# Patient Record
Sex: MALE | Race: WHITE | NOT HISPANIC OR LATINO | Employment: OTHER | URBAN - METROPOLITAN AREA
[De-identification: names, ages, dates, MRNs, and addresses within clinical notes are randomized per-mention and may not be internally consistent; named-entity substitution may affect disease eponyms.]

---

## 2020-01-07 ENCOUNTER — APPOINTMENT (OUTPATIENT)
Dept: PREADMISSION TESTING | Facility: HOSPITAL | Age: 60
End: 2020-01-07

## 2020-01-07 ENCOUNTER — HOSPITAL ENCOUNTER (OUTPATIENT)
Dept: GENERAL RADIOLOGY | Facility: HOSPITAL | Age: 60
Discharge: HOME | End: 2020-01-07
Admitting: ORTHOPAEDIC SURGERY

## 2020-01-07 VITALS — HEIGHT: 68 IN | WEIGHT: 225.09 LBS | BODY MASS INDEX: 34.11 KG/M2

## 2020-01-07 LAB
25(OH)D3 SERPL-MCNC: 20.1 NG/ML (ref 30–100)
ALBUMIN SERPL-MCNC: 4.2 G/DL (ref 3.5–5.2)
ALBUMIN/GLOB SERPL: 1.6 G/DL
ALP SERPL-CCNC: 87 U/L (ref 39–117)
ALT SERPL W P-5'-P-CCNC: 53 U/L (ref 1–41)
ANION GAP SERPL CALCULATED.3IONS-SCNC: 11 MMOL/L (ref 5–15)
APTT PPP: 34.6 SECONDS (ref 24–37)
AST SERPL-CCNC: 25 U/L (ref 1–40)
BASOPHILS # BLD AUTO: 0.03 10*3/MM3 (ref 0–0.2)
BASOPHILS NFR BLD AUTO: 0.5 % (ref 0–1.5)
BILIRUB SERPL-MCNC: 0.2 MG/DL (ref 0.2–1.2)
BUN BLD-MCNC: 15 MG/DL (ref 8–23)
BUN/CREAT SERPL: 16.5 (ref 7–25)
CALCIUM SPEC-SCNC: 9.4 MG/DL (ref 8.6–10.5)
CHLORIDE SERPL-SCNC: 103 MMOL/L (ref 98–107)
CO2 SERPL-SCNC: 29 MMOL/L (ref 22–29)
CREAT BLD-MCNC: 0.91 MG/DL (ref 0.76–1.27)
DEPRECATED RDW RBC AUTO: 42.7 FL (ref 37–54)
EOSINOPHIL # BLD AUTO: 0.15 10*3/MM3 (ref 0–0.4)
EOSINOPHIL NFR BLD AUTO: 2.5 % (ref 0.3–6.2)
ERYTHROCYTE [DISTWIDTH] IN BLOOD BY AUTOMATED COUNT: 13.1 % (ref 12.3–15.4)
GFR SERPL CREATININE-BSD FRML MDRD: 85 ML/MIN/1.73
GLOBULIN UR ELPH-MCNC: 2.7 GM/DL
GLUCOSE BLD-MCNC: 125 MG/DL (ref 65–99)
HBA1C MFR BLD: 6.1 % (ref 4.8–5.6)
HCT VFR BLD AUTO: 41.6 % (ref 37.5–51)
HGB BLD-MCNC: 13.2 G/DL (ref 13–17.7)
IMM GRANULOCYTES # BLD AUTO: 0.04 10*3/MM3 (ref 0–0.05)
IMM GRANULOCYTES NFR BLD AUTO: 0.7 % (ref 0–0.5)
INR PPP: 1.04 (ref 0.85–1.16)
LYMPHOCYTES # BLD AUTO: 1.99 10*3/MM3 (ref 0.7–3.1)
LYMPHOCYTES NFR BLD AUTO: 33.2 % (ref 19.6–45.3)
MCH RBC QN AUTO: 28.3 PG (ref 26.6–33)
MCHC RBC AUTO-ENTMCNC: 31.7 G/DL (ref 31.5–35.7)
MCV RBC AUTO: 89.1 FL (ref 79–97)
MONOCYTES # BLD AUTO: 0.38 10*3/MM3 (ref 0.1–0.9)
MONOCYTES NFR BLD AUTO: 6.3 % (ref 5–12)
MRSA DNA SPEC QL NAA+PROBE: NEGATIVE
NEUTROPHILS # BLD AUTO: 3.41 10*3/MM3 (ref 1.7–7)
NEUTROPHILS NFR BLD AUTO: 56.8 % (ref 42.7–76)
NRBC BLD AUTO-RTO: 0 /100 WBC (ref 0–0.2)
PLATELET # BLD AUTO: 255 10*3/MM3 (ref 140–450)
PMV BLD AUTO: 9.9 FL (ref 6–12)
POTASSIUM BLD-SCNC: 4.4 MMOL/L (ref 3.5–5.2)
PROT SERPL-MCNC: 6.9 G/DL (ref 6–8.5)
PROTHROMBIN TIME: 13.1 SECONDS (ref 11.2–14.3)
RBC # BLD AUTO: 4.67 10*6/MM3 (ref 4.14–5.8)
SODIUM BLD-SCNC: 143 MMOL/L (ref 136–145)
WBC NRBC COR # BLD: 6 10*3/MM3 (ref 3.4–10.8)

## 2020-01-07 PROCEDURE — 80053 COMPREHEN METABOLIC PANEL: CPT | Performed by: ORTHOPAEDIC SURGERY

## 2020-01-07 PROCEDURE — 36415 COLL VENOUS BLD VENIPUNCTURE: CPT

## 2020-01-07 PROCEDURE — 83036 HEMOGLOBIN GLYCOSYLATED A1C: CPT | Performed by: ORTHOPAEDIC SURGERY

## 2020-01-07 PROCEDURE — 85730 THROMBOPLASTIN TIME PARTIAL: CPT | Performed by: ORTHOPAEDIC SURGERY

## 2020-01-07 PROCEDURE — 85025 COMPLETE CBC W/AUTO DIFF WBC: CPT | Performed by: ORTHOPAEDIC SURGERY

## 2020-01-07 PROCEDURE — 71046 X-RAY EXAM CHEST 2 VIEWS: CPT

## 2020-01-07 PROCEDURE — 85610 PROTHROMBIN TIME: CPT | Performed by: ORTHOPAEDIC SURGERY

## 2020-01-07 PROCEDURE — 87641 MR-STAPH DNA AMP PROBE: CPT | Performed by: ORTHOPAEDIC SURGERY

## 2020-01-07 PROCEDURE — 82306 VITAMIN D 25 HYDROXY: CPT | Performed by: ORTHOPAEDIC SURGERY

## 2020-01-07 PROCEDURE — G0480 DRUG TEST DEF 1-7 CLASSES: HCPCS | Performed by: ORTHOPAEDIC SURGERY

## 2020-01-07 RX ORDER — ESCITALOPRAM OXALATE 20 MG/1
20 TABLET ORAL NIGHTLY
COMMUNITY

## 2020-01-07 RX ORDER — LISINOPRIL 10 MG/1
10 TABLET ORAL EVERY EVENING
COMMUNITY

## 2020-01-07 RX ORDER — METOPROLOL SUCCINATE 25 MG/1
25 TABLET, EXTENDED RELEASE ORAL EVERY EVENING
COMMUNITY

## 2020-01-07 RX ORDER — ATORVASTATIN CALCIUM 80 MG/1
80 TABLET, FILM COATED ORAL NIGHTLY
COMMUNITY

## 2020-01-07 ASSESSMENT — HOOS JR
HOOS JR SCORE: 19
HOOS JR SCORE: 29.009

## 2020-01-07 NOTE — PAT
Patient to apply Chlorhexadine wipes  to surgical area (as instructed) the night before procedure and the AM of procedure. Wipes provided.  Patient instructed to drink 20 ounces (or until full) of Gatorade and it needs to be completed 1 hour before given arrival time for procedure (NO RED Gatorade)    Patient verbalized understanding.  Yakelin Butcher notified of patient stating he had not been instructed to go to the joint class and is not attending today.   Colon screening information booklet given to patient during PAT visit  Patient does not have any dysuria, flank pain or urine frequency complaints.  EKG from 12/16/19 and cardiology note from Dr. Lopez on chart.

## 2020-01-08 ENCOUNTER — ANESTHESIA EVENT (OUTPATIENT)
Dept: PERIOP | Facility: HOSPITAL | Age: 60
End: 2020-01-08

## 2020-01-08 NOTE — PAT
Called [facility] office because RN trying to get clearance from Dr. Lopez have last office note from 12/16/19 on chart but no mention of clearance nor ok to stop Brilinta which patient stopped a week ago per Dr. Cevallos's instructions.  Per Jlil she has contacted the patient because Dr. Lopez office said patient was to have echo and stress test because he could be given clearance and patient was a no show.  Per Pattie she spoke with patient and he is to go today for testing and she is obtaining results if patient goes, if not he will be cancelled.

## 2020-01-09 ENCOUNTER — APPOINTMENT (OUTPATIENT)
Dept: GENERAL RADIOLOGY | Facility: HOSPITAL | Age: 60
End: 2020-01-09

## 2020-01-09 ENCOUNTER — ANESTHESIA (OUTPATIENT)
Dept: PERIOP | Facility: HOSPITAL | Age: 60
End: 2020-01-09

## 2020-01-09 ENCOUNTER — HOSPITAL ENCOUNTER (INPATIENT)
Facility: HOSPITAL | Age: 60
LOS: 1 days | Discharge: HOME | End: 2020-01-10
Attending: ORTHOPAEDIC SURGERY | Admitting: ORTHOPAEDIC SURGERY

## 2020-01-09 DIAGNOSIS — Z96.641 STATUS POST TOTAL REPLACEMENT OF RIGHT HIP: Primary | ICD-10-CM

## 2020-01-09 PROBLEM — M16.11 ARTHRITIS OF RIGHT HIP: Status: ACTIVE | Noted: 2020-01-09

## 2020-01-09 PROBLEM — I10 HYPERTENSION: Status: ACTIVE | Noted: 2020-01-09

## 2020-01-09 PROBLEM — I25.10 CORONARY ARTERY DISEASE: Status: ACTIVE | Noted: 2020-01-09

## 2020-01-09 PROBLEM — E78.5 HYPERLIPIDEMIA: Status: ACTIVE | Noted: 2020-01-09

## 2020-01-09 PROBLEM — R73.03 PREDIABETES: Status: ACTIVE | Noted: 2020-01-09

## 2020-01-09 LAB
GLUCOSE BLDC GLUCOMTR-MCNC: 216 MG/DL (ref 70–130)
GLUCOSE BLDC GLUCOMTR-MCNC: 223 MG/DL (ref 70–130)

## 2020-01-09 PROCEDURE — C1776 JOINT DEVICE (IMPLANTABLE): HCPCS | Performed by: ORTHOPAEDIC SURGERY

## 2020-01-09 PROCEDURE — C1713 ANCHOR/SCREW BN/BN,TIS/BN: HCPCS | Performed by: ORTHOPAEDIC SURGERY

## 2020-01-09 PROCEDURE — 76000 FLUOROSCOPY <1 HR PHYS/QHP: CPT

## 2020-01-09 PROCEDURE — 25010000003 CEFAZOLIN IN DEXTROSE 2-4 GM/100ML-% SOLUTION: Performed by: ORTHOPAEDIC SURGERY

## 2020-01-09 PROCEDURE — 63710000001 INSULIN LISPRO (HUMAN) PER 5 UNITS: Performed by: NURSE PRACTITIONER

## 2020-01-09 PROCEDURE — 97162 PT EVAL MOD COMPLEX 30 MIN: CPT | Performed by: PHYSICAL THERAPIST

## 2020-01-09 PROCEDURE — 25010000002 DEXAMETHASONE PER 1 MG: Performed by: NURSE ANESTHETIST, CERTIFIED REGISTERED

## 2020-01-09 PROCEDURE — 25010000002 CLONIDINE PER 1 MG: Performed by: ORTHOPAEDIC SURGERY

## 2020-01-09 PROCEDURE — 82962 GLUCOSE BLOOD TEST: CPT

## 2020-01-09 PROCEDURE — 0SR90JZ REPLACEMENT OF RIGHT HIP JOINT WITH SYNTHETIC SUBSTITUTE, OPEN APPROACH: ICD-10-PCS | Performed by: ORTHOPAEDIC SURGERY

## 2020-01-09 PROCEDURE — 25010000002 PROPOFOL 10 MG/ML EMULSION: Performed by: NURSE ANESTHETIST, CERTIFIED REGISTERED

## 2020-01-09 PROCEDURE — 25010000002 KETOROLAC TROMETHAMINE PER 15 MG: Performed by: ORTHOPAEDIC SURGERY

## 2020-01-09 PROCEDURE — 97116 GAIT TRAINING THERAPY: CPT | Performed by: PHYSICAL THERAPIST

## 2020-01-09 PROCEDURE — 25010000002 ONDANSETRON PER 1 MG: Performed by: NURSE ANESTHETIST, CERTIFIED REGISTERED

## 2020-01-09 PROCEDURE — 73502 X-RAY EXAM HIP UNI 2-3 VIEWS: CPT

## 2020-01-09 DEVICE — HD FEM/HIP G7 BIOLOX/DELTA OPTN 36MM: Type: IMPLANTABLE DEVICE | Site: HIP | Status: FUNCTIONAL

## 2020-01-09 DEVICE — SCRW ACET CORT TRILOGY S/TAP 6.5X25: Type: IMPLANTABLE DEVICE | Site: HIP | Status: FUNCTIONAL

## 2020-01-09 DEVICE — IMPLANTABLE DEVICE: Type: IMPLANTABLE DEVICE | Site: HIP | Status: FUNCTIONAL

## 2020-01-09 DEVICE — CAP CERAM HD HIP UPCHRG: Type: IMPLANTABLE DEVICE | Site: HIP | Status: FUNCTIONAL

## 2020-01-09 DEVICE — SHLL ACET OSSEOTI G7 3H SZE 52MM: Type: IMPLANTABLE DEVICE | Site: HIP | Status: FUNCTIONAL

## 2020-01-09 DEVICE — CAP HIP TM UPCHRG: Type: IMPLANTABLE DEVICE | Site: HIP | Status: FUNCTIONAL

## 2020-01-09 DEVICE — SUT NONABS MAXBRAID/PE NMBR2 HC5 38IN WHT 900333: Type: IMPLANTABLE DEVICE | Site: HIP | Status: FUNCTIONAL

## 2020-01-09 DEVICE — TOTAL HIP PRIMARY: Type: IMPLANTABLE DEVICE | Site: HIP | Status: FUNCTIONAL

## 2020-01-09 DEVICE — CAP HIP VE UPCHRG: Type: IMPLANTABLE DEVICE | Site: HIP | Status: FUNCTIONAL

## 2020-01-09 DEVICE — ADAPT HIP BIOLOX OPTN TYPE1 TPR MIN 6: Type: IMPLANTABLE DEVICE | Site: HIP | Status: FUNCTIONAL

## 2020-01-09 RX ORDER — CHOLECALCIFEROL (VITAMIN D3) 125 MCG
5 CAPSULE ORAL NIGHTLY PRN
Status: DISCONTINUED | OUTPATIENT
Start: 2020-01-09 | End: 2020-01-10 | Stop reason: HOSPADM

## 2020-01-09 RX ORDER — HYDROMORPHONE HYDROCHLORIDE 1 MG/ML
0.5 INJECTION, SOLUTION INTRAMUSCULAR; INTRAVENOUS; SUBCUTANEOUS
Status: DISCONTINUED | OUTPATIENT
Start: 2020-01-09 | End: 2020-01-09 | Stop reason: HOSPADM

## 2020-01-09 RX ORDER — SODIUM CHLORIDE 0.9 % (FLUSH) 0.9 %
10 SYRINGE (ML) INJECTION EVERY 12 HOURS SCHEDULED
Status: DISCONTINUED | OUTPATIENT
Start: 2020-01-09 | End: 2020-01-09 | Stop reason: HOSPADM

## 2020-01-09 RX ORDER — OXYCODONE HYDROCHLORIDE 5 MG/1
10 TABLET ORAL EVERY 4 HOURS PRN
Status: DISCONTINUED | OUTPATIENT
Start: 2020-01-09 | End: 2020-01-10 | Stop reason: HOSPADM

## 2020-01-09 RX ORDER — SODIUM CHLORIDE, SODIUM LACTATE, POTASSIUM CHLORIDE, CALCIUM CHLORIDE 600; 310; 30; 20 MG/100ML; MG/100ML; MG/100ML; MG/100ML
100 INJECTION, SOLUTION INTRAVENOUS CONTINUOUS
Status: DISCONTINUED | OUTPATIENT
Start: 2020-01-09 | End: 2020-01-10 | Stop reason: HOSPADM

## 2020-01-09 RX ORDER — DEXTROSE MONOHYDRATE 25 G/50ML
25 INJECTION, SOLUTION INTRAVENOUS
Status: DISCONTINUED | OUTPATIENT
Start: 2020-01-09 | End: 2020-01-10 | Stop reason: HOSPADM

## 2020-01-09 RX ORDER — ACETAMINOPHEN 500 MG
1000 TABLET ORAL EVERY 8 HOURS SCHEDULED
Status: DISCONTINUED | OUTPATIENT
Start: 2020-01-09 | End: 2020-01-10 | Stop reason: HOSPADM

## 2020-01-09 RX ORDER — LISINOPRIL 10 MG/1
10 TABLET ORAL DAILY
Status: DISCONTINUED | OUTPATIENT
Start: 2020-01-09 | End: 2020-01-10 | Stop reason: HOSPADM

## 2020-01-09 RX ORDER — ESCITALOPRAM OXALATE 20 MG/1
20 TABLET ORAL NIGHTLY
Status: DISCONTINUED | OUTPATIENT
Start: 2020-01-09 | End: 2020-01-10 | Stop reason: HOSPADM

## 2020-01-09 RX ORDER — BUPIVACAINE HYDROCHLORIDE 5 MG/ML
INJECTION, SOLUTION PERINEURAL
Status: COMPLETED | OUTPATIENT
Start: 2020-01-09 | End: 2020-01-09

## 2020-01-09 RX ORDER — KETOROLAC TROMETHAMINE 15 MG/ML
15 INJECTION, SOLUTION INTRAMUSCULAR; INTRAVENOUS EVERY 6 HOURS PRN
Status: DISCONTINUED | OUTPATIENT
Start: 2020-01-09 | End: 2020-01-10 | Stop reason: HOSPADM

## 2020-01-09 RX ORDER — MELOXICAM 15 MG/1
15 TABLET ORAL ONCE
Status: COMPLETED | OUTPATIENT
Start: 2020-01-09 | End: 2020-01-09

## 2020-01-09 RX ORDER — BISACODYL 10 MG
10 SUPPOSITORY, RECTAL RECTAL DAILY PRN
Status: DISCONTINUED | OUTPATIENT
Start: 2020-01-09 | End: 2020-01-10 | Stop reason: HOSPADM

## 2020-01-09 RX ORDER — AMOXICILLIN 250 MG
2 CAPSULE ORAL 2 TIMES DAILY PRN
Status: DISCONTINUED | OUTPATIENT
Start: 2020-01-09 | End: 2020-01-10 | Stop reason: HOSPADM

## 2020-01-09 RX ORDER — SODIUM CHLORIDE, SODIUM LACTATE, POTASSIUM CHLORIDE, CALCIUM CHLORIDE 600; 310; 30; 20 MG/100ML; MG/100ML; MG/100ML; MG/100ML
9 INJECTION, SOLUTION INTRAVENOUS CONTINUOUS
Status: DISCONTINUED | OUTPATIENT
Start: 2020-01-09 | End: 2020-01-09

## 2020-01-09 RX ORDER — NICOTINE POLACRILEX 4 MG
15 LOZENGE BUCCAL
Status: DISCONTINUED | OUTPATIENT
Start: 2020-01-09 | End: 2020-01-10 | Stop reason: HOSPADM

## 2020-01-09 RX ORDER — MELOXICAM 7.5 MG/1
15 TABLET ORAL DAILY
Status: DISCONTINUED | OUTPATIENT
Start: 2020-01-10 | End: 2020-01-10 | Stop reason: HOSPADM

## 2020-01-09 RX ORDER — FAMOTIDINE 20 MG/1
20 TABLET, FILM COATED ORAL ONCE
Status: COMPLETED | OUTPATIENT
Start: 2020-01-09 | End: 2020-01-09

## 2020-01-09 RX ORDER — PREGABALIN 75 MG/1
75 CAPSULE ORAL ONCE
Status: COMPLETED | OUTPATIENT
Start: 2020-01-09 | End: 2020-01-09

## 2020-01-09 RX ORDER — BISACODYL 5 MG/1
10 TABLET, DELAYED RELEASE ORAL DAILY PRN
Status: DISCONTINUED | OUTPATIENT
Start: 2020-01-09 | End: 2020-01-10 | Stop reason: HOSPADM

## 2020-01-09 RX ORDER — MAGNESIUM HYDROXIDE 1200 MG/15ML
LIQUID ORAL AS NEEDED
Status: DISCONTINUED | OUTPATIENT
Start: 2020-01-09 | End: 2020-01-09 | Stop reason: HOSPADM

## 2020-01-09 RX ORDER — LIDOCAINE HYDROCHLORIDE 10 MG/ML
0.5 INJECTION, SOLUTION EPIDURAL; INFILTRATION; INTRACAUDAL; PERINEURAL ONCE AS NEEDED
Status: COMPLETED | OUTPATIENT
Start: 2020-01-09 | End: 2020-01-09

## 2020-01-09 RX ORDER — CEFAZOLIN SODIUM 2 G/100ML
2 INJECTION, SOLUTION INTRAVENOUS ONCE
Status: COMPLETED | OUTPATIENT
Start: 2020-01-09 | End: 2020-01-09

## 2020-01-09 RX ORDER — METOPROLOL SUCCINATE 25 MG/1
25 TABLET, EXTENDED RELEASE ORAL DAILY
Status: DISCONTINUED | OUTPATIENT
Start: 2020-01-09 | End: 2020-01-10 | Stop reason: HOSPADM

## 2020-01-09 RX ORDER — ATORVASTATIN CALCIUM 40 MG/1
80 TABLET, FILM COATED ORAL NIGHTLY
Status: DISCONTINUED | OUTPATIENT
Start: 2020-01-09 | End: 2020-01-10 | Stop reason: HOSPADM

## 2020-01-09 RX ORDER — FENTANYL CITRATE 50 UG/ML
50 INJECTION, SOLUTION INTRAMUSCULAR; INTRAVENOUS
Status: DISCONTINUED | OUTPATIENT
Start: 2020-01-09 | End: 2020-01-09 | Stop reason: HOSPADM

## 2020-01-09 RX ORDER — DOCUSATE SODIUM 100 MG/1
100 CAPSULE, LIQUID FILLED ORAL 2 TIMES DAILY PRN
Status: DISCONTINUED | OUTPATIENT
Start: 2020-01-09 | End: 2020-01-10 | Stop reason: HOSPADM

## 2020-01-09 RX ORDER — HYDROMORPHONE HYDROCHLORIDE 1 MG/ML
0.5 INJECTION, SOLUTION INTRAMUSCULAR; INTRAVENOUS; SUBCUTANEOUS
Status: DISCONTINUED | OUTPATIENT
Start: 2020-01-09 | End: 2020-01-10 | Stop reason: HOSPADM

## 2020-01-09 RX ORDER — NALOXONE HCL 0.4 MG/ML
0.1 VIAL (ML) INJECTION
Status: DISCONTINUED | OUTPATIENT
Start: 2020-01-09 | End: 2020-01-10 | Stop reason: HOSPADM

## 2020-01-09 RX ORDER — OXYCODONE HYDROCHLORIDE 5 MG/1
5 TABLET ORAL EVERY 4 HOURS PRN
Status: DISCONTINUED | OUTPATIENT
Start: 2020-01-09 | End: 2020-01-10 | Stop reason: HOSPADM

## 2020-01-09 RX ORDER — ONDANSETRON 2 MG/ML
4 INJECTION INTRAMUSCULAR; INTRAVENOUS EVERY 6 HOURS PRN
Status: DISCONTINUED | OUTPATIENT
Start: 2020-01-09 | End: 2020-01-10 | Stop reason: HOSPADM

## 2020-01-09 RX ORDER — SODIUM CHLORIDE 0.9 % (FLUSH) 0.9 %
10 SYRINGE (ML) INJECTION AS NEEDED
Status: DISCONTINUED | OUTPATIENT
Start: 2020-01-09 | End: 2020-01-09 | Stop reason: HOSPADM

## 2020-01-09 RX ORDER — DEXAMETHASONE SODIUM PHOSPHATE 4 MG/ML
INJECTION, SOLUTION INTRA-ARTICULAR; INTRALESIONAL; INTRAMUSCULAR; INTRAVENOUS; SOFT TISSUE AS NEEDED
Status: DISCONTINUED | OUTPATIENT
Start: 2020-01-09 | End: 2020-01-09 | Stop reason: SURG

## 2020-01-09 RX ORDER — LABETALOL HYDROCHLORIDE 5 MG/ML
10 INJECTION, SOLUTION INTRAVENOUS EVERY 4 HOURS PRN
Status: DISCONTINUED | OUTPATIENT
Start: 2020-01-09 | End: 2020-01-10 | Stop reason: HOSPADM

## 2020-01-09 RX ORDER — ACETAMINOPHEN 500 MG
1000 TABLET ORAL ONCE
Status: COMPLETED | OUTPATIENT
Start: 2020-01-09 | End: 2020-01-09

## 2020-01-09 RX ORDER — CEFAZOLIN SODIUM 2 G/100ML
2 INJECTION, SOLUTION INTRAVENOUS EVERY 8 HOURS
Status: COMPLETED | OUTPATIENT
Start: 2020-01-09 | End: 2020-01-10

## 2020-01-09 RX ORDER — FAMOTIDINE 10 MG/ML
20 INJECTION, SOLUTION INTRAVENOUS ONCE
Status: DISCONTINUED | OUTPATIENT
Start: 2020-01-09 | End: 2020-01-09 | Stop reason: HOSPADM

## 2020-01-09 RX ORDER — ONDANSETRON 2 MG/ML
INJECTION INTRAMUSCULAR; INTRAVENOUS AS NEEDED
Status: DISCONTINUED | OUTPATIENT
Start: 2020-01-09 | End: 2020-01-09 | Stop reason: SURG

## 2020-01-09 RX ADMIN — PROPOFOL 60 MCG/KG/MIN: 10 INJECTION, EMULSION INTRAVENOUS at 10:30

## 2020-01-09 RX ADMIN — OXYCODONE HYDROCHLORIDE 10 MG: 5 TABLET ORAL at 21:09

## 2020-01-09 RX ADMIN — CEFAZOLIN SODIUM 2 G: 2 INJECTION, SOLUTION INTRAVENOUS at 18:00

## 2020-01-09 RX ADMIN — OXYCODONE HYDROCHLORIDE 5 MG: 5 TABLET ORAL at 17:27

## 2020-01-09 RX ADMIN — DOCUSATE SODIUM 100 MG: 100 CAPSULE, LIQUID FILLED ORAL at 21:08

## 2020-01-09 RX ADMIN — ACETAMINOPHEN 1000 MG: 500 TABLET ORAL at 08:04

## 2020-01-09 RX ADMIN — FAMOTIDINE 20 MG: 20 TABLET ORAL at 08:04

## 2020-01-09 RX ADMIN — TRANEXAMIC ACID 1000 MG: 100 INJECTION, SOLUTION INTRAVENOUS at 11:53

## 2020-01-09 RX ADMIN — INSULIN LISPRO 3 UNITS: 100 INJECTION, SOLUTION INTRAVENOUS; SUBCUTANEOUS at 17:40

## 2020-01-09 RX ADMIN — LISINOPRIL 10 MG: 10 TABLET ORAL at 17:40

## 2020-01-09 RX ADMIN — BUPIVACAINE HYDROCHLORIDE 1.8 ML: 5 INJECTION, SOLUTION PERINEURAL at 10:23

## 2020-01-09 RX ADMIN — ACETAMINOPHEN 1000 MG: 500 TABLET, FILM COATED ORAL at 21:09

## 2020-01-09 RX ADMIN — ATORVASTATIN CALCIUM 80 MG: 40 TABLET, FILM COATED ORAL at 21:09

## 2020-01-09 RX ADMIN — INSULIN LISPRO 3 UNITS: 100 INJECTION, SOLUTION INTRAVENOUS; SUBCUTANEOUS at 21:13

## 2020-01-09 RX ADMIN — EPHEDRINE SULFATE 10 MG: 50 INJECTION INTRAMUSCULAR; INTRAVENOUS; SUBCUTANEOUS at 10:35

## 2020-01-09 RX ADMIN — DEXAMETHASONE SODIUM PHOSPHATE 8 MG: 4 INJECTION, SOLUTION INTRAMUSCULAR; INTRAVENOUS at 10:40

## 2020-01-09 RX ADMIN — LIDOCAINE HYDROCHLORIDE 0.2 ML: 10 INJECTION, SOLUTION EPIDURAL; INFILTRATION; INTRACAUDAL; PERINEURAL at 08:04

## 2020-01-09 RX ADMIN — MELOXICAM 15 MG: 15 TABLET ORAL at 08:04

## 2020-01-09 RX ADMIN — MELATONIN TAB 5 MG 5 MG: 5 TAB at 21:09

## 2020-01-09 RX ADMIN — TRANEXAMIC ACID 1000 MG: 100 INJECTION, SOLUTION INTRAVENOUS at 10:30

## 2020-01-09 RX ADMIN — PREGABALIN 75 MG: 75 CAPSULE ORAL at 08:04

## 2020-01-09 RX ADMIN — MUPIROCIN 1 APPLICATION: 20 OINTMENT TOPICAL at 08:04

## 2020-01-09 RX ADMIN — SODIUM CHLORIDE, POTASSIUM CHLORIDE, SODIUM LACTATE AND CALCIUM CHLORIDE 9 ML/HR: 600; 310; 30; 20 INJECTION, SOLUTION INTRAVENOUS at 08:04

## 2020-01-09 RX ADMIN — ONDANSETRON 4 MG: 2 INJECTION INTRAMUSCULAR; INTRAVENOUS at 11:59

## 2020-01-09 RX ADMIN — ESCITALOPRAM 20 MG: 20 TABLET, FILM COATED ORAL at 21:08

## 2020-01-09 RX ADMIN — METOPROLOL SUCCINATE 25 MG: 25 TABLET, EXTENDED RELEASE ORAL at 17:40

## 2020-01-09 RX ADMIN — CEFAZOLIN SODIUM 2 G: 2 INJECTION, SOLUTION INTRAVENOUS at 10:15

## 2020-01-09 NOTE — THERAPY EVALUATION
Patient Name: Osvaldo Vences  : 1960    MRN: [id number]                              Today's Date: 2020       Admit Date: 2020    Visit Dx: No diagnosis found.  Patient Active Problem List   Diagnosis   • Arthritis of right hip   • Status post total replacement of right hip   • Hyperlipidemia   • Hypertension   • Coronary artery disease   • Prediabetes     Past Medical History:   Diagnosis Date   • Arthritis    • Coronary artery disease    • History of heart attack    • Horseshoe kidney     history of   • Hypertension    • Wears glasses      Past Surgical History:   Procedure Laterality Date   • CARDIAC CATHETERIZATION     • COLONOSCOPY      over ten years ago since last colonoscopy ; info given    • CORONARY ANGIOPLASTY WITH STENT PLACEMENT     • KIDNEY SURGERY      horseshoe kidney correction surgery      General Information     Row Name 20 1720          PT Evaluation Time/Intention    Document Type  evaluation  -CS     Mode of Treatment  individual therapy;physical therapy  -CS     Row Name 20 1720          General Information    Patient Profile Reviewed?  yes  -CS     Prior Level of Function  min assist:;bathing;home management;dressing;cooking;cleaning;gait mobility limited by R hip pain  -CS     Existing Precautions/Restrictions  fall;right;hip, anterior  -CS     Barriers to Rehab  none identified  -CS     Row Name 20 1720          Relationship/Environment    Lives With  spouse Spouse able to assist at all times once pt is discharged  -CS     Row Name 20 1720          Resource/Environmental Concerns    Current Living Arrangements  home/apartment/condo  -CS     Row Name 20 1720          Home Main Entrance    Number of Stairs, Main Entrance  one  -CS     Stair Railings, Main Entrance  none  -CS     Row Name 20 1720          Stairs Within Home, Primary    Stairs, Within Home, Primary  2 story house - but patient able to stay on first floor at discharge   -CS      Number of Stairs, Within Home, Primary  none  -CS     Row Name 01/09/20 1720          Cognitive Assessment/Intervention- PT/OT    Orientation Status (Cognition)  oriented x 4  -CS     Row Name 01/09/20 1720          Safety Issues, Functional Mobility    Safety Issues Affecting Function (Mobility)  safety precautions follow-through/compliance;safety precaution awareness  -CS     Impairments Affecting Function (Mobility)  endurance/activity tolerance;pain;range of motion (ROM);strength  -CS       User Key  (r) = Recorded By, (t) = Taken By, (c) = Cosigned By    Initials Name Provider Type    CS Zenaida Hugo, PT Physical Therapist        Mobility     Row Name 01/09/20 1720          Bed Mobility Assessment/Treatment    Bed Mobility Assessment/Treatment  supine-sit  -CS     Supine-Sit [place] (Bed Mobility)  verbal cues;minimum assist (75% patient effort)  -CS     Assistive Device (Bed Mobility)  head of bed elevated  -CS     Comment (Bed Mobility)  VC's to move LE to EOB. Pt needed min assist with lifting R LE to EOB due to weakness. Pt denied dizziness with sitting EOB  -CS     Row Name 01/09/20 1720          Transfer Assessment/Treatment    Comment (Transfers)  VC's to push off of bed to stand and to reach back for chair to sit. VC's to step R LE out with transfers for comfort.   -CS     Row Name 01/09/20 1720          Sit-Stand Transfer    Sit-Stand [place] (Transfers)  contact guard;2 person assist  -CS     Assistive Device (Sit-Stand Transfers)  walker, front-wheeled  -CS     Row Name 01/09/20 1720          Gait/Stairs Assessment/Training    10042 - Gait Training Minutes   10  -CS     Gait/Stairs Assessment/Training  gait/ambulation independence;gait/ambulation assistive device;gait pattern;distance ambulated  -     [place] Level (Gait)  contact guard;2 person assist  -CS     Assistive Device (Gait)  walker, front-wheeled  -CS     Distance in Feet (Gait)  350'  -     Pattern (Gait)   step-through  -CS     Deviations/Abnormal Patterns (Gait)  bilateral deviations;maia decreased;gait speed decreased;stride length decreased  -CS     Right Sided Gait Deviations  weight shift ability decreased  -CS     Comment (Gait/Stairs)  Pt able to amb 350' with RW CGA x 2 with step through gait mechanics. Pt limited by fatigue. VC's to stay within walker, increase heel strike on R LE, and increase WB on R LE.   -CS     Row Name 01/09/20 1720          Mobility Assessment/Intervention    Extremity Weight-bearing Status  right lower extremity  -CS     Right Lower Extremity (Weight-bearing Status)  weight-bearing as tolerated (WBAT)  -CS       User Key  (r) = Recorded By, (t) = Taken By, (c) = Cosigned By    Initials Name Provider Type    CS Zenaida Hugo PT Physical Therapist        Obj/Interventions     Row Name 01/09/20 1720          General ROM    GENERAL ROM COMMENTS  Pt's R hip ROM limited by 25%  -     Row Name 01/09/20 1720          MMT (Manual Muscle Testing)    General MMT Comments  Pt unable to perform SLR on R LE indep. Pt's R LE MMT grossly 3-/5  -CS     Row Name 01/09/20 1720          Therapeutic Exercise    Lower Extremity (Therapeutic Exercise)  gluteal sets;quad sets, right  -CS     Lower Extremity Range of Motion (Therapeutic Exercise)  ankle dorsiflexion/plantar flexion, bilateral  -CS     Exercise Type (Therapeutic Exercise)  isometric contraction, static;AROM (active range of motion)  -CS     Position (Therapeutic Exercise)  supine  -CS     Sets/Reps (Therapeutic Exercise)  10 reps each  -CS     Comment (Therapeutic Exercise)  VC's on technique. Pt able to actively dorsiflex  -     Row Name 01/09/20 1720          Sensory Assessment/Intervention    Sensory General Assessment  no sensation deficits identified  -CS       User Key  (r) = Recorded By, (t) = Taken By, (c) = Cosigned By    Initials Name Provider Type    Zenaida Orellana PT Physical Therapist        Goals/Plan      Row Name 01/09/20 1720          Bed Mobility Goal 1 (PT)    Activity/Assistive Device (Bed Mobility Goal 1, PT)  bed mobility activities, all  -CS     [place] Level/Cues Needed (Bed Mobility Goal 1, PT)  conditional independence  -CS     Time Frame (Bed Mobility Goal 1, PT)  long term goal (LTG);3 days  -CS     Progress/Outcomes (Bed Mobility Goal 1, PT)  goal ongoing  -CS     Row Name 01/09/20 1720          Transfer Goal 1 (PT)    Activity/Assistive Device (Transfer Goal 1, PT)  sit-to-stand/stand-to-sit;bed-to-chair/chair-to-bed;walker, rolling  -CS     [place] Level/Cues Needed (Transfer Goal 1, PT)  conditional independence  -CS     Time Frame (Transfer Goal 1, PT)  long term goal (LTG);3 days  -CS     Progress/Outcome (Transfer Goal 1, PT)  goal ongoing  -CS     Row Name 01/09/20 1720          Gait Training Goal 1 (PT)    Activity/Assistive Device (Gait Training Goal 1, PT)  gait (walking locomotion);assistive device use;walker, rolling  -CS     [place] Level (Gait Training Goal 1, PT)  conditional independence  -CS     Distance (Gait Goal 1, PT)  500'  -CS     Time Frame (Gait Training Goal 1, PT)  long term goal (LTG);3 days  -CS     Progress/Outcome (Gait Training Goal 1, PT)  goal ongoing  -CS     Row Name 01/09/20 1720          Stairs Goal 1 (PT)    Activity/Assistive Device (Stairs Goal 1, PT)  stairs, all skills;walker, rolling  -CS     [place] Level/Cues Needed (Stairs Goal 1, PT)  contact guard assist  -CS     Number of Stairs (Stairs Goal 1, PT)  1  -CS     Time Frame (Stairs Goal 1, PT)  long term goal (LTG);3 days  -CS     Progress/Outcome (Stairs Goal 1, PT)  goal ongoing  -CS       User Key  (r) = Recorded By, (t) = Taken By, (c) = Cosigned By    Initials Name Provider Type    CS Zenaida Hugo, PT Physical Therapist        Clinical Impression     Row Name 01/09/20 1720          Pain Assessment    Additional Documentation  Pain Scale: Numbers Pre/Post-Treatment (Group)   -CS     Row Name 01/09/20 1720          Pain Scale: Numbers Pre/Post-Treatment    Pain Scale: Numbers, Pretreatment  3/10  -CS     Pain Scale: Numbers, Post-Treatment  3/10  -CS     Pain Location - Side  Right  -CS     Pain Location - Orientation  lower  -CS     Pain Location  groin  -CS     Pain Intervention(s)  Repositioned;Ambulation/increased activity  -CS     Row Name 01/09/20 1720          Plan of Care Review    Plan of Care Reviewed With  patient;spouse  -CS     Progress  improving  -CS     Outcome Summary  Pt able to amb 350' with RW CGA x 2 with step through gait mechanics. Pt limited by fatigue. Encouraged patient to ambulate later with nursing. Recommend patient home with assist and home health PT at discharge. PADD: 11  -CS     Row Name 01/09/20 1720          Physical Therapy Clinical Impression    Patient/Family Goals Statement (PT Clinical Impression)  To go home  -CS     Criteria for Skilled Interventions Met (PT Clinical Impression)  yes;treatment indicated  -CS     Rehab Potential (PT Clinical Summary)  good, to achieve stated therapy goals  -CS     Predicted Duration of Therapy (PT)  3 days  -CS     Row Name 01/09/20 1720          Positioning and Restraints    Pre-Treatment Position  in bed  -CS     Post Treatment Position  chair  -CS     In Chair  notified nsg;reclined;call light within reach;encouraged to call for assist;exit alarm on;with family/caregiver;legs elevated  -CS       User Key  (r) = Recorded By, (t) = Taken By, (c) = Cosigned By    Initials Name Provider Type    CS Zenaida Hugo, PT Physical Therapist        Outcome Measures     Row Name 01/09/20 1720          How much help from another person do you currently need...    Turning from your back to your side while in flat bed without using bedrails?  3  -CS     Moving from lying on back to sitting on the side of a flat bed without bedrails?  3  -CS     Moving to and from a bed to a chair (including a wheelchair)?  3  -CS      Standing up from a chair using your arms (e.g., wheelchair, bedside chair)?  3  -CS     Climbing 3-5 steps with a railing?  3  -CS     To walk in hospital room?  3  -CS     AM-PAC 6 Clicks Score (PT)  18  -CS     Row Name 01/09/20 1720          Functional Assessment    Outcome Measure Options  AM-PAC 6 Clicks Basic Mobility (PT)  -CS       User Key  (r) = Recorded By, (t) = Taken By, (c) = Cosigned By    Initials Name Provider Type    CS Zenaida Hugo PT Physical Therapist        Physical Therapy Education                 Title: PT OT SLP Therapies (Done)     Topic: Physical Therapy (Done)     Point: Mobility training (Done)     Description:   Instruct learner(s) on safety and technique for assisting patient out of bed, chair or wheelchair.  Instruct in the proper use of assistive devices, such as walker, crutches, cane or brace.              Patient Friendly Description:   It's important to get you on your feet again, but we need to do so in a way that is safe for you. Falling has serious consequences, and your personal safety is the most important thing of all.        When it's time to get out of bed, one of us or a family member will sit next to you on the bed to give you support.     If your doctor or nurse tells you to use a walker, crutches, a cane, or a brace, be sure you use it every time you get out of bed, even if you think you don't need it.    Learning Progress Summary           Patient Acceptance, E,TB, VU by  at 1/9/2020 1720    Comment:  Educated patient on anterior hip precaution, WB status, safe hand placement with transfers, gait mechanics, and plan for progression of care.   Significant Other Acceptance, E,TB, VU by  at 1/9/2020 1720    Comment:  Educated patient on anterior hip precaution, WB status, safe hand placement with transfers, gait mechanics, and plan for progression of care.                   Point: Home exercise program (Done)     Description:   Instruct learner(s) on  appropriate technique for monitoring, assisting and/or progressing patient with therapeutic exercises and activities.              Learning Progress Summary           Patient Acceptance, E,TB, VU by CS at 1/9/2020 1720    Comment:  Educated patient on anterior hip precaution, WB status, safe hand placement with transfers, gait mechanics, and plan for progression of care.   Significant Other Acceptance, E,TB, VU by CS at 1/9/2020 1720    Comment:  Educated patient on anterior hip precaution, WB status, safe hand placement with transfers, gait mechanics, and plan for progression of care.                   Point: Body mechanics (Done)     Description:   Instruct learner(s) on proper positioning and spine alignment for patient and/or caregiver during mobility tasks and/or exercises.              Learning Progress Summary           Patient Acceptance, E,TB, VU by CS at 1/9/2020 1720    Comment:  Educated patient on anterior hip precaution, WB status, safe hand placement with transfers, gait mechanics, and plan for progression of care.   Significant Other Acceptance, E,TB, VU by CS at 1/9/2020 1720    Comment:  Educated patient on anterior hip precaution, WB status, safe hand placement with transfers, gait mechanics, and plan for progression of care.                   Point: Precautions (Done)     Description:   Instruct learner(s) on prescribed precautions during mobility and gait tasks              Learning Progress Summary           Patient Acceptance, E,TB, VU by CS at 1/9/2020 1720    Comment:  Educated patient on anterior hip precaution, WB status, safe hand placement with transfers, gait mechanics, and plan for progression of care.   Significant Other Acceptance, E,TB, VU by CS at 1/9/2020 1720    Comment:  Educated patient on anterior hip precaution, WB status, safe hand placement with transfers, gait mechanics, and plan for progression of care.                               User Key     Initials Effective Dates  Name Provider Type Discipline     03/26/19 -  Zenaida Hugo PT Physical Therapist PT              PT Recommendation and Plan  Planned Therapy Interventions (PT Eval): balance training, bed mobility training, gait training, home exercise program, neuromuscular re-education, patient/family education, ROM (range of motion), stair training, strengthening, transfer training  Outcome Summary/Treatment Plan (PT)  Anticipated Equipment Needs at Discharge (PT): other (see comments)(none)  Anticipated Discharge Disposition (PT): home with assist, home with home health  Plan of Care Reviewed With: patient, spouse  Progress: improving  Outcome Summary: Pt able to amb 350' with RW CGA x 2 with step through gait mechanics. Pt limited by fatigue. Encouraged patient to ambulate later with nursing. Recommend patient home with assist and home health PT at discharge. PADD: 11     Time Calculation:   PT Charges     Row Name 01/09/20 1720             Time Calculation    Start Time  1720  -      PT Received On  01/09/20  -      PT Goal Re-Cert Due Date  01/19/20  -         Time Calculation- PT    Total Timed Code Minutes- PT  12 minute(s)  -CS         Timed Charges    83423 - PT Therapeutic Exercise Minutes  2  -CS      22286 - Gait Training Minutes   10  -CS        User Key  (r) = Recorded By, (t) = Taken By, (c) = Cosigned By    Initials Name Provider Type     Zenaida Hugo, SHRADDHA Physical Therapist        Therapy Charges for Today     Code Description Service Date Service Provider Modifiers Qty    64085003576 HC GAIT TRAINING EA 15 MIN 1/9/2020 Zenaida Hugo, PT GP 1    99545615279 HC PT EVAL MOD COMPLEXITY 3 1/9/2020 Zenaida Hugo, PT GP 1    78112114336 HC PT THER SUPP EA 15 MIN 1/9/2020 Zenaida Hugo, PT GP 2          PT G-Codes  Outcome Measure Options: AM-PAC 6 Clicks Basic Mobility (PT)  AM-PAC 6 Clicks Score (PT): 18    Zenaida Hugo PT  1/9/2020

## 2020-01-09 NOTE — PLAN OF CARE
Problem: Patient Care Overview  Goal: Plan of Care Review  Flowsheets  Taken 1/9/2020 1819 by Zenaida Hugo, PT  Progress: improving  Taken 1/9/2020 1800 by Omayra Morel RN  Plan of Care Reviewed With: patient  Taken 1/9/2020 1720 by Zenaida Hugo, PT  Outcome Summary: Pt able to amb 350' with RW CGA x 2 with step through gait mechanics. Pt limited by fatigue. Encouraged patient to ambulate later with nursing. Recommend patient home with assist and home health PT at discharge. PADD: 11

## 2020-01-09 NOTE — ANESTHESIA PREPROCEDURE EVALUATION
Anesthesia Evaluation                  Airway   Mallampati: I  TM distance: >3 FB  Neck ROM: full  No difficulty expected  Dental      Pulmonary    Cardiovascular     ECG reviewed    (+) hypertension, CAD,       Neuro/Psych  GI/Hepatic/Renal/Endo    (+)   renal disease,     Musculoskeletal     Abdominal    Substance History      OB/GYN          Other                        Anesthesia Plan    ASA 3     spinal     intravenous induction     Anesthetic plan, all risks, benefits, and alternatives have been provided, discussed and informed consent has been obtained with: patient.    Plan discussed with CRNA.

## 2020-01-09 NOTE — INTERVAL H&P NOTE
"Pre-Op H&P (See Recent Office Note Attached for Full H&P)    Chief complaint: Right hip pain    Review of Systems:  General ROS:  no fever, chills, rashes, No change since last office visit  Cardiovascular ROS: no chest pain or dyspnea on exertion.  History of CAD s/p stents.  +cardiac clearance  Respiratory ROS: no cough, shortness of breath, or wheezing    Meds:    No current facility-administered medications on file prior to encounter.      No current outpatient medications on file prior to encounter.       Vital Signs:  /87 (BP Location: Right arm, Patient Position: Lying)   Pulse 67   Temp 97.6 °F (36.4 °C) (Temporal)   Resp 16   Ht 172.7 cm (67.99\")   Wt 102 kg (225 lb 1.4 oz)   SpO2 96%   BMI 34.23 kg/m²     Physical Exam:    CV:  S1S2 regular rate and rhythm, no murmur               Resp:  Clear to auscultation; respirations regular, even and unlabored    Results Review:     Lab Results   Component Value Date    WBC 6.00 01/07/2020    HGB 13.2 01/07/2020    HCT 41.6 01/07/2020    MCV 89.1 01/07/2020     01/07/2020    NEUTROABS 3.41 01/07/2020    GLUCOSE 125 (H) 01/07/2020    BUN 15 01/07/2020    CREATININE 0.91 01/07/2020    EGFRIFNONA 85 01/07/2020     01/07/2020    K 4.4 01/07/2020     01/07/2020    CO2 29.0 01/07/2020    CALCIUM 9.4 01/07/2020    ALBUMIN 4.20 01/07/2020    AST 25 01/07/2020    ALT 53 (H) 01/07/2020    BILITOT 0.2 01/07/2020        I reviewed the patient's new clinical results.    Cancer Staging (if applicable)  Cancer Patient: __ yes _x_no __unknown; If yes, clinical stage T:__ N:__M:__, stage group or __N/A    Chandni Lim, APRN  1/9/2020   8:13 AM    "

## 2020-01-09 NOTE — ANESTHESIA POSTPROCEDURE EVALUATION
Patient: Osvaldo Vences    Procedure Summary     Date:  01/09/20 Room / Location:   [facility]    Anesthesia Start:  1014 Anesthesia Stop:  1234    Procedure:  TOTAL HIP ARTHROPLASTY ANTERIOR DIRECT RIGHT (Right Hip) Diagnosis:       Arthritis of left hip      (Arthritis of left hip [[id number]])    Surgeon:  Colin Cevallos MD Provider:  Bishop Nguyen MD    Anesthesia Type:  spinal ASA Status:  3          Anesthesia Type: spinal    Vitals  No vitals data found for the desired time range.          Post Anesthesia Care and Evaluation    Patient location during evaluation: PACU  Patient participation: complete - patient participated  Level of consciousness: awake and alert  Pain score: 0  Pain management: adequate  Airway patency: patent  Anesthetic complications: No anesthetic complications  PONV Status: none  Cardiovascular status: hemodynamically stable and acceptable  Respiratory status: nonlabored ventilation, acceptable and nasal cannula  Hydration status: acceptable

## 2020-01-09 NOTE — OP NOTE
TOTAL HIP ARTHROPLASTY ANTERIOR  Procedure Report    Patient Name:  Osvaldo Vences  YOB: 1960    Date of Surgery:  1/9/2020     Indications:    59 y.o. male who was admitted to [facility] on a progressive management of nonoperative treatment of their hip osteoarthritis. Unfortunately patient progressed with significant pain and difficulty with ambulation and daily function.  The patient was indicated for a total hip arthroplasty. Likely risk benefits of the procedure including but not limited to infection, DVT, pulmonary embolism, leg length discrepancy, recurrent dislocation, possibility of injury to nerves and vessels, and periprosthetic fractures have been discussed with the patient and her daughter in detail. Despite the risks involved the patient elected to proceed with an informed consent was obtained.     Patient Identification:  Patient was seen in the prep, consent was reviewed, operative procedure was identified, surgical site and thigh marked.    Complexity Modifier:   Due to the patients Body mass index is 34.23 kg/m²., the surgery required additional surgical dissection, assistance with retraction, and increased surgical exposure in order to perform the total joint replacement. The required additional assistance in the operating room, unique instrumentation and techniques, increased time, increased risk, which all made for a more complex and difficult joint replacement procedure. This way due to the increased body mass index and obesity in addition to the severity of the arthritis. Therefore, a Modifier 22 is being utilized.      Pre-op Diagnosis:   Arthritis of right hip [1955466]       Post-Op Diagnosis Codes:     * Arthritis of right hip [M16.11]    Procedure/CPT® Codes:      Procedure(s):  TOTAL HIP ARTHROPLASTY ANTERIOR DIRECT RIGHT    Staff:  Surgeon(s):  Colin Cevallos MD    Assistant: Neena Livingston PA-C    Anesthesia: Spinal    Estimated Blood Loss:  200ml    Implants:    Implant Name Type Inv. Item Serial No.  Lot No. LRB No. Used   SUT MAXBRAID 2 HC5 38IN [facility] [id number] - [id number] Implant SUT MAXBRAID 2 HC5 38IN [facility] [id number]  [employer]  Right 2   SHLL ACET OSSEOTI G7 3H CORBIN 52MM - [id number] Implant SHLL ACET OSSEOTI G7 3H CORBIN 52MM  [employer] [id number] Right 1   SCRW ACET RAMIREZ TRILOGY S/TAP 6.5X25 - [id number] Implant SCRW ACET RAMIREZ TRILOGY S/TAP 6.5X25  [employer] [id number] Right 1   LINER ACET G7 NEUTRAL E1 CORBIN 36MM - [id number] Implant LINER ACET G7 NEUTRAL E1 CORBIN 36MM  [employer] [id number] Right 1   STEM FEM TAPERLOC COMPL DIST REDUC HI9 - [id number] Implant STEM FEM TAPERLOC COMPL DIST REDUC HI9  [employer] [id number] Right 1   HD FEM BIOLOX OPTN TYPE1 TPR  16/18 36 - SNA - [id number] Implant HD FEM BIOLOX OPTN TYPE1 TPR  16/18 36 NA [employer] [id number] Right 1   ADAPT HIP BIOLOX OPTN TYPE1 TPR MIN 6 - SNA - [id number] Implant ADAPT HIP BIOLOX OPTN TYPE1 TPR MIN 6 NA [employer] [id number] Right 1       Specimen:          None      Findings: See operative dictation    Complications: None    Description of Procedure: The patient was transferred to [facility] operating room preoperative antibiotics in form of Kefzol 1gm and Kefzol 2gm Given IV Prior to Skin Incision As Well As 1 G of Tranexemic Acid. Patient Received Spinalanesthesia and was transferred to the [facility]. All Bony Prominences Were Padded Adequately. The Operative Hip Was Then Prepped and Draped in the Usual Sterile Fashion. Multiple timeouts Were Done Identifying the Correct Patient Surgical Site and Planned Procedure.  A Skin Incision Was Made Overlying the Anterior Lateral Aspect of the Hip for about 10 Cm Just below the Lateral to the Anterior Superior Iliac Spine. Skin and Subcutaneous Tissue and Fascia Was Incised in Line with the Skin Incision Overlying the Tensor Fascia Cassy Muscle. The Tensor Muscle Was Then Retracted Laterally and the Interval  between Sartorius and Rectus Femoris Medially and the Tensor Fascia Muscle Were Developed. The Lateral Femoral Circumflex Vessels Were Identified They Were Ligated without Difficulty. The deep Fascia Was Incised and the Capsule of the Hip Joint Where Was Identified. We then placed a soft tissue protecting device deep to the rectus and the tensor. Retractors were then Placed Extracapsular the Capsule Was Then Incised in a T-Shaped Manner and the Anterior Posterior Capsules Were Then Tied with maxbraid suture . Following This Retractors Were Placed Intracapsularly. We Did Identify the Obvious signs of severe osteoarthritis upon Incising the Capsule. We Then Made Appropriate Releases Done on the Inferior Medial Neck and along the Saddle of the Femoral Neck Femoral Neck Remaining Was Identified and Osteotomy Was Done and According to the Preoperative Templating Is an Oscillating Saw. The Femoral Head and Cut Neck with Extracted Using a Corkscrew without Difficulty the Femoral Head Did Have Major Signs of Articular Cartilage Loss Loss or Superior Wear.  The Acetabular Cavity Was Exposed by Placing Retractors and taking Care to Protect the Anterior vascular Structures the Labrum Was Excised the pulvinar was Excised from the Cotyloid Fossa Acetabular Cavity Was Then Progressively Reamed Maintaining the Correct Inclination and Version under Image Intensifier Control. Adequate Medialization Was Obtained. Adequate Fit Was Found to Be Obtained with the Reamer Size of 51. The Biomet G7 ossioiti Cup Size 52 Was Then Impacted into Position. This Found to Seat Satisfactorily with Adequate Inclination and Anteversion. It Was Stable but we did Insert 1 6.5/25 Millimeter Lag Screw This Was Checked under Fluoroscopic Fluoroscopy and Found to Be in Good Position. Following this the cup was cleaned and then a 36 neutralE-Poly impacted Following This the Periarticular Tissues Were Then Infiltrated with Local Anesthetic.  Attention Was  Then Directed to the Proximal Femur the Proximal Femur Was Delivered Using a Trochanteric Hook Placed Just Distal to the Vastus Tubercle and Proximal to the gluet Javi Tendon. The leg was then Externally Rotated Gross Traction Released and Hyperextension and Adduction Was Done Using the Karolyn Table. Mechanical Lift on the Table Was Utilized to Support the Femur Appropriate Capsular Releases Were Made to Expose the Medial Solano of the Greater Trochanter and Proximal Femur Was Delivered the Femoral Canal Was Then Entered by Removing Lateral Femoral Neck and with a Box Chisel by Serial Broaching Adequate Fit Was Found to Be Obtained with the Size 9 broach. We then trialed a -6 neck was reduced fluoroscopic imaging was used to assess leg length and offset was found to be symmetric. The trials were then removed a #9 Taperloc complete press-fit stem high offset was implanted in appropriate anteversion. It sat very similar to our broach trial we chose the -6 neck ceramic with titanium sleeve. This was then reduced again fluoroscopy remaining images both lateral and AP of the femur showed the stem to be in good position AP pelvis showed symmetric leg length and offset. The hip was then taken through a range of motion and found to be stable. There were no acute fractures there and wound was then thoroughly irrigated saline we did use of more of the injection cocktail. Betadine irrigation was used followed by 3L of saline irrigation. Soft tissue hemostasis was secured and topical TXA was used. Sponge and needle instrument counts were correct maxibraid sutures directly anterior and posterior capsular flaps were tied to each other then closed the fascial layer with a running #2 strata fix followed by 2-0 Vicryl and then Monocryl for the skin and Dermabond with perneo mesh over the top. Once the Dermabond had dried sterile dressing placed over the top. The patient was then transferred to the recovery room in stable condition  patient tolerated the procedure well there were no Medications the patient adequate distal pulses and good cap refill.  The patient will be mobilized by physical therapy received antibiotic prophylaxis postoperatively for 2 more doses given the first 24 hours. The patient was started on DVT prophylaxis with patient's home dose of Brilinta on starting postoperative day #1.  I discussed the satisfactory performance of the procedure with the patient's family and discussed the postoperative management.      Assistant Participation:  Surgeon(s):  Colin Cevallos MD    Assistant: Neena Livingston PA-C assisted with proper preoperative positioning, preoperative templating, determingin availability of proper implants, prepping and draping of patient, manipulation placement of instruments, protection of ligaments and vital soft tissue structures, assistance in maintaining hemostasis and assistance with closure of the wound. Their skills and knowledge of the steps of operation and the desired outcome of each surgical step was crucial, allowing for efficient choreography of surgical procedure, and closure of the wound which lead to reduced surgical time, less blood loss, and less risk of complications for the patient.       Colin Cevallos MD     Date: 1/9/2020  Time: 12:21 PM

## 2020-01-09 NOTE — ANESTHESIA PROCEDURE NOTES
Spinal Block      Patient reassessed immediately prior to procedure    Patient location during procedure: OR  Indication:at surgeon's request  Performed By  CRNA: Diogenes Ramon CRNA  Preanesthetic Checklist  Completed: patient identified, site marked, surgical consent, pre-op evaluation, timeout performed, IV checked, risks and benefits discussed and monitors and equipment checked  Spinal Block Prep:  Patient Position:sitting  Sterile Tech:cap, gloves, sterile barriers and mask  Prep:Chloraprep  Patient Monitoring:blood pressure monitoring, continuous pulse oximetry and EKG  Spinal Block Procedure  Approach:midline  Guidance:landmark technique and palpation technique  Location:L4-L5  Needle Type:Quincke  Needle Gauge:22 G  Placement of Spinal needle event:cerebrospinal fluid aspirated  Paresthesia: no  Fluid Appearance:clear  Medications: bupivacaine (MARCAINE) 0.5 % injection, 1.8 mL  Med Administered at 1/9/2020 10:23 AM   Post Assessment  Patient Tolerance:patient tolerated the procedure well with no apparent complications  Complications no  Additional Notes  Procedure:  Pt assisted to sitting position, with legs in position of comfort over side of bed.  Pt. instructed in optimal spine presentation, the spine was prepped/ Draped and the skin at insertion site was anesthetized with 1% Lidocaine 2 ml.  The spinal needle was then advanced until CSF flow was obtained and LA was injected:

## 2020-01-09 NOTE — H&P
"Patient Name: Osvaldo Vences  MRN: [id number]  : 1960  DOS: 2020    Attending: Colin Cevallos MD    Primary Care Provider: J Carlos Cagle MD      Chief complaint: Right hip pain    Subjective   Patient is a 59 y.o. male presented for right total hip arthroplasty by Dr. Cevallos.    He underwent surgery under spinal anesthesia. He tolerated surgery well and is admitted for further medical management. His hip has been painful for many years. He denies use of assistive device for ambulation or recent falls.    When seen in PACU he is doing well. His pain is well controlled. He denies nausea, shortness of breath or chest pain. No hx of DVT or PE.    He has hx of HTN, HLD, CAD. He is followed by Dr. Lopez, cardiology, and had preop cardiac clearance.     ( Above is noted/ agree. Seen in his room afterwards. Doing well. Good pain control. No f/c/n/vom/sob. Seen by PT \"Pt able to amb 350' with RW CGA x 2 with step through gait mechanics.\" )wy  Allergies:  No Known Allergies    Meds:  Medications Prior to Admission   Medication Sig Dispense Refill Last Dose   • atorvastatin (LIPITOR) 80 MG tablet Take 80 mg by mouth Every Night.   2020 at 2100   • escitalopram (LEXAPRO) 20 MG tablet Take 20 mg by mouth Every Night.   2020 at 2100   • lisinopril (PRINIVIL,ZESTRIL) 10 MG tablet Take 10 mg by mouth Daily.   2020 at 2100   • metoprolol succinate XL (TOPROL-XL) 25 MG 24 hr tablet Take 25 mg by mouth Daily.   2020 at 2100   • Ticagrelor (BRILINTA PO) Take 60 mg by mouth 2 (Two) Times a Day. Patient's last dose was 1-2-20 per Dr. Cevallos's orders-left message with Dr. Cevallos and Dr. Lopez to \"ok\" to stop.   2020 at 0800       History:   Past Medical History:   Diagnosis Date   • Arthritis    • Coronary artery disease    • History of heart attack    • Horseshoe kidney     history of   • Hypertension    • Wears glasses      Past Surgical History:   Procedure Laterality Date   • CARDIAC " "CATHETERIZATION     • COLONOSCOPY      over ten years ago since last colonoscopy ; info given    • CORONARY ANGIOPLASTY WITH STENT PLACEMENT     • KIDNEY SURGERY      horseshoe kidney correction surgery      History reviewed. No pertinent family history.  Social History     Tobacco Use   • Smoking status: Never Smoker   • Smokeless tobacco: Never Used   Substance Use Topics   • Alcohol use: Not Currently     Frequency: Never   • Drug use: Never   He is  with 1 children. She is retired from [employer].    Review of Systems  Pertinent items are noted in HPI, all other systems reviewed and negative    Vital Signs  Visit Vitals  /90   Pulse 87   Temp 97.4 °F (36.3 °C)   Resp 16   Ht 172.7 cm (67.99\")   Wt 102 kg (225 lb 1.4 oz)   SpO2 99%   BMI 34.23 kg/m²       Physical Exam:    General Appearance:    Alert, cooperative, in no acute distress   Head:    Normocephalic, without obvious abnormality, atraumatic   Eyes:            Lids and lashes normal, conjunctivae and sclerae normal, no   icterus, no pallor, corneas clear   Ears:    Ears appear intact with no abnormalities noted   Neck:   No adenopathy, supple, trachea midline, no thyromegaly   Lungs:     Clear to auscultation, respirations regular, even and                   unlabored    Heart:    Regular rhythm and normal rate, normal S1 and S2, no            murmur, no gallop   Abdomen:     Normal bowel sounds, no masses, no organomegaly, soft        non-tender, non-distended, no guarding, no rebound                 tenderness   Genitalia:    Deferred   Extremities:   Right hip Optifoam CDI.   Pulses:   Pulses palpable and equal bilaterally   Skin:   No bleeding, bruising or rash   Neurologic:   Cranial nerves 2 - 12 grossly intact, sensation intact. Flexion and dorsiflexion intact bilateral feet.        I reviewed the patient's new clinical results.       Results from last 7 days   Lab Units 01/07/20  1225   WBC 10*3/mm3 6.00   HEMOGLOBIN g/dL 13.2 "   HEMATOCRIT % 41.6   PLATELETS 10*3/mm3 255     Results from last 7 days   Lab Units 01/07/20  1225   SODIUM mmol/L 143   POTASSIUM mmol/L 4.4   CHLORIDE mmol/L 103   CO2 mmol/L 29.0   BUN mg/dL 15   CREATININE mg/dL 0.91   CALCIUM mg/dL 9.4   BILIRUBIN mg/dL 0.2   ALK PHOS U/L 87   ALT (SGPT) U/L 53*   AST (SGOT) U/L 25   GLUCOSE mg/dL 125*     Lab Results   Component Value Date    HGBA1C 6.10 (H) 01/07/2020       Assessment and Plan:     Status post total replacement of right hip    Arthritis of right hip    Hyperlipidemia    Hypertension    Coronary artery disease    Prediabetes      Plan  1. PT/OT- early ambulation postop  2. Pain control-prns   3. IS-encourage  4. DVT proph- Mechs/brilinta ( starting POD 1)wy  5. Bowel regimen  6. Resume home medications as appropriate  7. Monitor post-op labs  8. Discharge planning forhome    HTN, Hyperlipidemia, CAD  - Continue home statin, lisinopril and Toprol  - Monitor BP   - Holding parameters for BP meds  - Labetalol PRN for SBP>170    PreDM  - hgb A1c on 1/7/2020 6.1  - Accu-Chek AC and HS with low dose SSI      I believe this patient meets INPATIENT status due to the need for care which can only be reasonably provided in a hospital setting such as aggressive/expedited ancillary services and/or consultation services, the necessity for IV medications, close physician monitoring and/or the possible need for procedures.  As such, I feel patient’s risk for adverse outcomes and need for care warrant INPATIENT evaluation and predict the patient’s care encounter to likely last beyond 2 midnights.      EDUARDO Reece  01/09/20  3:53 PM     I have personally performed the evaluation on this patient. My history is consistent  with HPI obtained. My exam findings are listed above. I have personally reviewed and discussed the above formulated treatment plan with APRN.  Late entry for visit 1/9/20.wy

## 2020-01-10 VITALS
HEIGHT: 68 IN | SYSTOLIC BLOOD PRESSURE: 117 MMHG | BODY MASS INDEX: 34.11 KG/M2 | HEART RATE: 69 BPM | OXYGEN SATURATION: 94 % | TEMPERATURE: 97.5 F | WEIGHT: 225.09 LBS | RESPIRATION RATE: 16 BRPM | DIASTOLIC BLOOD PRESSURE: 64 MMHG

## 2020-01-10 PROBLEM — G89.18 ACUTE POSTOPERATIVE PAIN: Status: ACTIVE | Noted: 2020-01-10

## 2020-01-10 PROBLEM — D62 ACUTE BLOOD LOSS ANEMIA: Status: ACTIVE | Noted: 2020-01-10

## 2020-01-10 PROBLEM — D72.829 LEUKOCYTOSIS: Status: ACTIVE | Noted: 2020-01-10

## 2020-01-10 LAB
ANION GAP SERPL CALCULATED.3IONS-SCNC: 10 MMOL/L (ref 5–15)
BUN BLD-MCNC: 17 MG/DL (ref 8–23)
BUN/CREAT SERPL: 19.8 (ref 7–25)
CALCIUM SPEC-SCNC: 8.6 MG/DL (ref 8.6–10.5)
CHLORIDE SERPL-SCNC: 103 MMOL/L (ref 98–107)
CO2 SERPL-SCNC: 25 MMOL/L (ref 22–29)
CREAT BLD-MCNC: 0.86 MG/DL (ref 0.76–1.27)
DEPRECATED RDW RBC AUTO: 42.9 FL (ref 37–54)
ERYTHROCYTE [DISTWIDTH] IN BLOOD BY AUTOMATED COUNT: 13.2 % (ref 12.3–15.4)
GFR SERPL CREATININE-BSD FRML MDRD: 90 ML/MIN/1.73
GLUCOSE BLD-MCNC: 138 MG/DL (ref 65–99)
GLUCOSE BLDC GLUCOMTR-MCNC: 117 MG/DL (ref 70–130)
GLUCOSE BLDC GLUCOMTR-MCNC: 130 MG/DL (ref 70–130)
HCT VFR BLD AUTO: 34.2 % (ref 37.5–51)
HGB BLD-MCNC: 11 G/DL (ref 13–17.7)
MCH RBC QN AUTO: 28.8 PG (ref 26.6–33)
MCHC RBC AUTO-ENTMCNC: 32.2 G/DL (ref 31.5–35.7)
MCV RBC AUTO: 89.5 FL (ref 79–97)
PLATELET # BLD AUTO: 220 10*3/MM3 (ref 140–450)
PMV BLD AUTO: 10.3 FL (ref 6–12)
POTASSIUM BLD-SCNC: 4.4 MMOL/L (ref 3.5–5.2)
RBC # BLD AUTO: 3.82 10*6/MM3 (ref 4.14–5.8)
SODIUM BLD-SCNC: 138 MMOL/L (ref 136–145)
WBC NRBC COR # BLD: 12.08 10*3/MM3 (ref 3.4–10.8)

## 2020-01-10 PROCEDURE — 25010000002 KETOROLAC TROMETHAMINE PER 15 MG: Performed by: ORTHOPAEDIC SURGERY

## 2020-01-10 PROCEDURE — 85027 COMPLETE CBC AUTOMATED: CPT | Performed by: NURSE PRACTITIONER

## 2020-01-10 PROCEDURE — 80048 BASIC METABOLIC PNL TOTAL CA: CPT | Performed by: ORTHOPAEDIC SURGERY

## 2020-01-10 PROCEDURE — 25010000003 CEFAZOLIN IN DEXTROSE 2-4 GM/100ML-% SOLUTION: Performed by: ORTHOPAEDIC SURGERY

## 2020-01-10 PROCEDURE — 82962 GLUCOSE BLOOD TEST: CPT

## 2020-01-10 PROCEDURE — 97110 THERAPEUTIC EXERCISES: CPT | Performed by: PHYSICAL THERAPIST

## 2020-01-10 PROCEDURE — 97116 GAIT TRAINING THERAPY: CPT | Performed by: PHYSICAL THERAPIST

## 2020-01-10 RX ORDER — PSEUDOEPHEDRINE HCL 30 MG
100 TABLET ORAL 2 TIMES DAILY PRN
Start: 2020-01-10 | End: 2020-01-24

## 2020-01-10 RX ORDER — HYDROCODONE BITARTRATE AND ACETAMINOPHEN 7.5; 325 MG/1; MG/1
1 TABLET ORAL EVERY 6 HOURS PRN
Start: 2020-01-10 | End: 2022-11-04 | Stop reason: HOSPADM

## 2020-01-10 RX ADMIN — MELOXICAM 15 MG: 7.5 TABLET ORAL at 10:41

## 2020-01-10 RX ADMIN — OXYCODONE HYDROCHLORIDE 10 MG: 5 TABLET ORAL at 00:40

## 2020-01-10 RX ADMIN — OXYCODONE HYDROCHLORIDE 5 MG: 5 TABLET ORAL at 10:41

## 2020-01-10 RX ADMIN — TICAGRELOR 60 MG: 60 TABLET ORAL at 10:40

## 2020-01-10 RX ADMIN — METOPROLOL SUCCINATE 25 MG: 25 TABLET, EXTENDED RELEASE ORAL at 10:41

## 2020-01-10 RX ADMIN — CEFAZOLIN SODIUM 2 G: 2 INJECTION, SOLUTION INTRAVENOUS at 02:49

## 2020-01-10 RX ADMIN — ACETAMINOPHEN 1000 MG: 500 TABLET, FILM COATED ORAL at 06:32

## 2020-01-10 RX ADMIN — KETOROLAC TROMETHAMINE 15 MG: 15 INJECTION, SOLUTION INTRAMUSCULAR; INTRAVENOUS at 00:38

## 2020-01-10 NOTE — PROGRESS NOTES
Discharge Planning Assessment  [facility]     Patient Name: Osvaldo Meredith  MRN: [id number]  Today's Date: 1/10/2020    Admit Date: 1/9/2020    Discharge Needs Assessment     Row Name 01/10/20 1051       Living Environment    Lives With  spouse    Name(s) of Who Lives With Patient  Grecia Meredith ( spouse) [phone]    Current Living Arrangements  home/apartment/condo    Primary Care Provided by  self    Provides Primary Care For  no one    Family Caregiver if Needed  spouse    Family Caregiver Names  Grecia ( spouse)    Quality of Family Relationships  helpful;involved;supportive    Able to Return to Prior Arrangements  yes       Resource/Environmental Concerns    Resource/Environmental Concerns  none    Transportation Concerns  car, none       Transition Planning    Patient/Family Anticipates Transition to  home with family    Patient/Family Anticipated Services at Transition  rehabilitation services;outpatient care    Transportation Anticipated  family or friend will provide       Discharge Needs Assessment    Readmission Within the Last 30 Days  no previous admission in last 30 days    Concerns to be Addressed  discharge planning;basic needs    Equipment Currently Used at Home  walker, rolling    Anticipated Changes Related to Illness  inability to care for self    Equipment Needed After Discharge  none    Outpatient/Agency/Support Group Needs  outpatient therapy    Provided post acute provider list?  -- arrangemetns already made pre admit with [facility]    Current Discharge Risk  dependent with mobility/activities of daily living        Discharge Plan     Row Name 01/10/20 1055       Plan    Plan  Home with oupt PT    Plan Comments  I met with Mr meredith and wife at bedside to discuss d/c plan. His plan is to return home. He lives with wife who works,but has arranged to take time off to assist. they live in a two level home,but he stays on main level. He already has a rolling walker. Dr Cevallos's office has made  arrangements for  his office  ( [facility]) to provide  outpt PT, they will see him in his home until ready to transition to outpatient. No other d/c needs identified.    Final Discharge Disposition Code  01 - home or self-care        Destination      Coordination has not been started for this encounter.      Durable Medical Equipment      Coordination has not been started for this encounter.      Dialysis/Infusion      Coordination has not been started for this encounter.      Home Medical Care      Coordination has not been started for this encounter.      Therapy      Coordination has not been started for this encounter.      Community Resources      Coordination has not been started for this encounter.        Expected Discharge Date and Time     Expected Discharge Date Expected Discharge Time    Cesar 10, 2020         Demographic Summary     Row Name 01/10/20 1050       General Information    Admission Type  inpatient    Arrived From  home    Referral Source  physician    Reason for Consult  discharge planning    Preferred Language  English    General Information Comments  PCP- J Carlos Cagle       Contact Information    Permission Granted to Share Info With  ;family/designee        Functional Status     Row Name 01/10/20 1051       Functional Status    Usual Activity Tolerance  good       Functional Status, IADL    Medications  independent    Meal Preparation  independent    Housekeeping  independent    Laundry  independent    Shopping  independent       Mental Status    General Appearance WDL  WDL       Mental Status Summary    Recent Changes in Mental Status/Cognitive Functioning  no changes       Employment/    Employment Status  retired    Employment/ Comments  insurance- Medicare & [employer]        Psychosocial    No documentation.       Abuse/Neglect    No documentation.       Legal    No documentation.       Substance Abuse    No documentation.       Patient Forms     No documentation.           Sonja C Kellerman, RN

## 2020-01-10 NOTE — PLAN OF CARE
Problem: Patient Care Overview  Goal: Plan of Care Review  Outcome: Ongoing (interventions implemented as appropriate)  Flowsheets (Taken 1/10/2020 8332)  Progress: improving  Plan of Care Reviewed With: patient  Outcome Summary: Pt continues to do well. Pt remains A&Ox4. Pt VSS throughout shift. Pt pain has been well controlled w PO pain meds, and one dose IV toradol. Pt dressing remains c/d/i. Pt has ambulated in olmos during shift. Pt continues to void spontaneously. Plan for possible d/c today. Will continue to monitor.  Goal: Individualization and Mutuality  Outcome: Ongoing (interventions implemented as appropriate)  Goal: Discharge Needs Assessment  Outcome: Ongoing (interventions implemented as appropriate)  Goal: Interprofessional Rounds/Family Conf  Outcome: Ongoing (interventions implemented as appropriate)     Problem: Pain, Chronic (Adult)  Goal: Acceptable Pain/Comfort Level and Functional Ability  Outcome: Ongoing (interventions implemented as appropriate)  Flowsheets (Taken 1/10/2020 4879)  Acceptable Pain/Comfort Level and Functional Ability: making progress toward outcome     Problem: Hip Arthroplasty (Total, Partial) (Adult)  Goal: Signs and Symptoms of Listed Potential Problems Will be Absent, Minimized or Managed (Hip Arthroplasty)  Outcome: Ongoing (interventions implemented as appropriate)  Flowsheets (Taken 1/10/2020 0359)  Problems Assessed (Hip Arthroplasty): all  Problems Present (Hip Arthroplasty): pain; situational response  Goal: Anesthesia/Sedation Recovery  Outcome: Ongoing (interventions implemented as appropriate)

## 2020-01-10 NOTE — PROGRESS NOTES
Orthopedic Progress Note      Patient: Osvaldo Vences  YOB: 1960     Date of Admission: 1/9/2020  7:20 AM Medical Record Number: [id number]     Attending Physician: Colin Cevallos MD    Status Post:  Procedure(s):  TOTAL HIP ARTHROPLASTY ANTERIOR DIRECT RIGHT Post Operative Day Number: 1    Subjective : No new orthopaedic complaints     Pain Relief: some relief with present medication.     Systemic Complaints: No Complaints  Vitals:    01/09/20 1859 01/09/20 2220 01/10/20 0252 01/10/20 0749   BP: 124/77 105/59 142/78 138/81   BP Location: Right arm Right arm Right arm Right arm   Patient Position: Sitting Lying Lying Lying   Pulse: 94 83 64 60   Resp: 16 14 16 16   Temp: 97.3 °F (36.3 °C) 97.2 °F (36.2 °C) 97.3 °F (36.3 °C) 97.5 °F (36.4 °C)   TempSrc: Oral Oral Oral Oral   SpO2: 92% 94%     Weight:       Height:           Physical Exam: 59 y.o. male    General Appearance:       Alert, cooperative, in no acute distress                  Extremities:    Dressing Clean, Dry and Intact               No clinical sign of DVT        Able to do good movements of digits    Pulses:   Pulses palpable and equal bilaterally           Diagnostic Tests:     Results from last 7 days   Lab Units 01/10/20  0418 01/07/20  1225   WBC 10*3/mm3 12.08* 6.00   HEMOGLOBIN g/dL 11.0* 13.2   HEMATOCRIT % 34.2* 41.6   PLATELETS 10*3/mm3 220 255     Results from last 7 days   Lab Units 01/10/20  0418 01/07/20  1225   SODIUM mmol/L 138 143   POTASSIUM mmol/L 4.4 4.4   CHLORIDE mmol/L 103 103   CO2 mmol/L 25.0 29.0   BUN mg/dL 17 15   CREATININE mg/dL 0.86 0.91   GLUCOSE mg/dL 138* 125*   CALCIUM mg/dL 8.6 9.4     Results from last 7 days   Lab Units 01/07/20  1225   INR  1.04   APTT seconds 34.6     No results found for: URICACID  No results found for: CRYSTAL  Microbiology Results (last 10 days)     Procedure Component Value - Date/Time    MRSA Screen, PCR - Swab, Nares [[id number]]  (Normal) Collected:  01/07/20 1225    Lab  Status:  Final result Specimen:  Swab from Nares Updated:  01/07/20 1611     MRSA PCR Negative    Narrative:       MRSA Negative        Fl C Arm During Surgery    Result Date: 1/9/2020  Intraoperative fluoroscopy utilized during right total hip arthroplasty.   D:  01/09/2020 E:  01/09/2020  This report was finalized on 1/9/2020 2:45 PM by Dr. Howard Greene.      Xr Chest Pa & Lateral    Result Date: 1/9/2020  No acute cardiopulmonary disease.  D:  01/08/2020 E:  01/08/2020  This report was finalized on 1/9/2020 9:17 AM by Dr. Tiesha Barragan MD.      Xr Hip With Or Without Pelvis 2 - 3 View Right    Result Date: 1/9/2020  Right hip prosthesis in anatomic alignment. No acute bony abnormality is seen.  D:  01/09/2020 E:  01/09/2020     This report was finalized on 1/9/2020 9:53 PM by Dr. Angel García MD.          Current Medications:  Scheduled Meds:  acetaminophen 1,000 mg Oral Q8H   atorvastatin 80 mg Oral Nightly   escitalopram 20 mg Oral Nightly   insulin lispro 0-7 Units Subcutaneous 4x Daily With Meals & Nightly   lisinopril 10 mg Oral Daily   meloxicam 15 mg Oral Daily   metoprolol succinate XL 25 mg Oral Daily   ticagrelor 60 mg Oral BID     Continuous Infusions:  lactated ringers 100 mL/hr Last Rate: 100 mL/hr (01/09/20 6793)     PRN Meds:.bisacodyl  •  bisacodyl  •  dextrose  •  dextrose  •  docusate sodium  •  glucagon (human recombinant)  •  HYDROmorphone **AND** naloxone  •  ketorolac  •  labetalol  •  melatonin  •  ondansetron  •  oxyCODONE  •  oxyCODONE  •  sennosides-docusate  •  sodium chloride    Assessment: Status post  TOTAL HIP ARTHROPLASTY ANTERIOR DIRECT RIGHT    Patient Active Problem List   Diagnosis   • Arthritis of right hip   • Status post total replacement of right hip   • Hyperlipidemia   • Hypertension   • Coronary artery disease   • Prediabetes       PLAN:   Continues current post-op course  Anticoagulation: At home anticoagulation medications including Brilinta and aspirin  Mobilize  with PT as tolerated per protocol    Weight Bearing: WBAT  Discharge Plan: OK to plan for discharge in  today to home  from orthopadic perspective.    Colin Cevallos MD    Date: 1/10/2020    Time: 8:21 AM

## 2020-01-10 NOTE — DISCHARGE SUMMARY
Patient Name: Osvaldo Vences  MRN: [id number]  : 1960  DOS: 1/10/2020    Attending: Colin Cevallos MD    Primary Care Provider: J Carlos Cagle MD    Date of Admission:.2020  7:20 AM    Date of Discharge:  1/10/2020    Discharge Diagnosis:   Status post total replacement of right hip    Arthritis of right hip    Hyperlipidemia    Hypertension    Coronary artery disease    Prediabetes    Leukocytosis, likely reactive    Acute blood loss anemia, mild, asymptomatic    Acute postoperative pain      Hospital Course  Patient is a 59 y.o. male presented for right total hip arthroplasty by Dr. Cevallos.     He underwent surgery under spinal anesthesia. He tolerated surgery well and was admitted for further medical management. His hip has been painful for many years. He denies use of assistive device for ambulation or recent falls.     He has hx of HTN, HLD, CAD. He is followed by Dr. Lopez, cardiology, and had preop cardiac clearance.       The patient has done well postop. The patient has been able to ambulate 350 feet with PT.    The patient has had good pain control with PO pain medications.  Adjustments were made to pain medications to optimize postop pain management. Risks and benefits of opiate medications discussed with patient.    The patient was placed on DVT prophylaxis including aspirin. The patient was encouraged to use IS for atelectasis prophylaxis.   The patient was placed on a bowel regimen to prevent constipation while on pain medication.   The patient's H/H was monitored with a slight decrease that remained asymptomatic.    It is felt by all involved that the patient can discharge home at this time, and the patient has no further questions      Procedures Performed  Pre-op Diagnosis:   Arthritis of right hip [5637577]       Post-Op Diagnosis Codes:     * Arthritis of right hip [M16.11]     Procedure/CPT® Codes:     Procedure(s):  TOTAL HIP ARTHROPLASTY ANTERIOR DIRECT  "RIGHT     Staff:  Surgeon(s):  Colin Cevallos MD    Pertinent Test Results:    I reviewed the patient's new clinical results.   Results from last 7 days   Lab Units 01/10/20  0418 20  1225   WBC 10*3/mm3 12.08* 6.00   HEMOGLOBIN g/dL 11.0* 13.2   HEMATOCRIT % 34.2* 41.6   PLATELETS 10*3/mm3 220 255     Results from last 7 days   Lab Units 01/10/20  0418 20  1225   SODIUM mmol/L 138 143   POTASSIUM mmol/L 4.4 4.4   CHLORIDE mmol/L 103 103   CO2 mmol/L 25.0 29.0   BUN mg/dL 17 15   CREATININE mg/dL 0.86 0.91   CALCIUM mg/dL 8.6 9.4   BILIRUBIN mg/dL  --  0.2   ALK PHOS U/L  --  87   ALT (SGPT) U/L  --  53*   AST (SGOT) U/L  --  25   GLUCOSE mg/dL 138* 125*     Results for AICHA ZHONG (MRN [id number]) as of 1/10/2020 10:19   Ref. Range 2020 17:20 2020 20:57 1/10/2020 04:18 1/10/2020 07:51   Glucose Latest Ref Range: 70 - 130 mg/dL 216 (H) 223 (H) 138 (H) 130     I reviewed the patient's new imaging including images and reports.      Physical therapy: Pt able to amb 350' with RW CGA x 2 with step through gait mechanics. Pt limited by fatigue. Encouraged patient to ambulate later with nursing. Recommend patient home with assist and home health PT at discharge. PADD: 11    Discharge Assessment:    Vital Signs  Visit Vitals  /81 (BP Location: Right arm, Patient Position: Lying)   Pulse 60   Temp 97.5 °F (36.4 °C) (Oral)   Resp 16   Ht 172.7 cm (67.99\")   Wt 102 kg (225 lb 1.4 oz)   SpO2 94%   BMI 34.23 kg/m²     Temp (24hrs), Av.5 °F (36.4 °C), Min:97.2 °F (36.2 °C), Max:98.4 °F (36.9 °C)      General Appearance:    Alert, cooperative, in no acute distress   Lungs:     Clear to auscultation,respirations regular, even and                   unlabored    Heart:    Regular rhythm and normal rate, normal S1 and S2   Abdomen:     Normal bowel sounds, no masses, no organomegaly, soft        non-tender, non-distended, no guarding, no rebound                 tenderness   Extremities:   Moves all " "extremities well, no edema, no cyanosis, no              Redness. Right hip Optifoam CDI   Pulses:   Pulses palpable and equal bilaterally   Skin:   No bleeding, bruising or rash   Neurologic:   Cranial nerves 2 - 12 grossly intact, sensation intact. Flexion and dorsiflexion intact bilateral feet.       Discharge Disposition: Home    Discharge Medications     Discharge Medications      New Medications      Instructions Start Date   docusate sodium 100 MG capsule   100 mg, Oral, 2 Times Daily PRN      HYDROcodone-acetaminophen 7.5-325 MG per tablet  Commonly known as:  NORCO   1 tablet, Oral, Every 6 Hours PRN         Continue These Medications      Instructions Start Date   atorvastatin 80 MG tablet  Commonly known as:  LIPITOR   80 mg, Oral, Nightly      BRILINTA PO   60 mg, Oral, 2 Times Daily, Patient's last dose was 1-2-20 per Dr. Cevallos's orders-left message with Dr. Cevallos and Dr. Lopez to \"ok\" to stop.      LEXAPRO 20 MG tablet  Generic drug:  escitalopram   20 mg, Oral, Nightly      lisinopril 10 MG tablet  Commonly known as:  PRINIVIL,ZESTRIL   10 mg, Oral, Daily      metoprolol succinate XL 25 MG 24 hr tablet  Commonly known as:  TOPROL-XL   25 mg, Oral, Daily             Discharge Diet: Consistent carb diet    Activity at Discharge: WBAT RLE    Follow-up Appointments  Dr. Cevallos per his orders    Discharge took over 30 min.    EDUARDO Reece  01/10/20  10:20 AM  "

## 2020-01-10 NOTE — PLAN OF CARE
Problem: Patient Care Overview  Goal: Plan of Care Review  1/10/2020 1157 by Zenaida Hugo PT  Outcome: Ongoing (interventions implemented as appropriate)  Flowsheets  Taken 1/10/2020 1157  Progress: improving  Taken 1/10/2020 0906  Plan of Care Reviewed With: patient  Outcome Summary: Pt able to amb 700' with RW CGA with step through gait mechanics. Pt limited by fatigue. Pt able to ascend/descend 1 step with RW CGA. Progressed patient's HEP and patient tolerated well. Recommend home with assist and home health PT.

## 2020-01-10 NOTE — DISCHARGE INSTRUCTIONS
INCISION CARE Revision Hip and Knee:  1. You have a sterile dressing in place. Only exchange the dressing if it becomes saturated (fluid draining out the sides of dressing) and notify the office. The dressing is water resistant. During the first 7 days after surgery, it is ok to shower. DO NOT scrub on or around the dressing. Do not submerge the dressing.  2. After 7 days, you can remove your dressing. You will have sutures in place on your skin. It is OK to shower with the incision exposed. DO NOT scrub on or around the incision. DO NOT submerge the incision in pools, baths, or hot tubs for 1 month after surgery. Do not touch or pick at the incision. If you have any drainage from the incision after 7 days, cover the incision with sterile gauze and paper tape and alert the office.   3. Staples will be removed between 10-14 days postoperation.  This may be done by either the home health nurse, rehabilitation nurse, or during your return visit to Dr. Cevallos's office.  4. No creams or ointments to the incision for 1 month after surgery. After 1 month, it is recommended to massage the scar with vitamin E cream to help decrease scar formation.  5. Check incision every day and notify surgeon immediately if any of the following signs or symptoms are noted:  o Increase in redness  o Increase in swelling around the incision and of the entire extremity  o Increase in pain  o Drainage oozing from the incision  o Pulling apart of the edges of the incision  o Increase in overall body temperature (greater than 100.5 degrees)    Anticoagulants: You will be discharged on an anticoagulant. This is a prophylactic medication that helps prevent blood clots during your post-operative period. Most patients will be on *** Aspirin 81 mg Enteric coated every 12 hours orally for 30 days. Some patients, due to increased risk factors, will need to be on a stronger anticoagulant. Dr. Cevallos will discuss this need with you.   ? While taking the  anticoagulant, you should avoid taking any additional aspirin, and limit ibuprofen (Advil or Motrin), Aleve (Naprosyn) or other non-steroidal anti-inflammatory medications.   ? Notify your surgeon immediately if any tania bleeding is noted in the urine, stool, vomit, or from the nose or the incision. Blood in the stool will often appear as black rather than red. Blood in urine may appear as pink. Blood in vomit may appear as brown/black like coffee grounds.  ? You will need to apply pressure for longer periods of time to any cuts or abrasions to stop bleeding  ? Avoid alcohol while taking anticoagulants  Sequential Compression Device: You maybe be discharged home with a compression device that helps promote blood flow and prevent clots in your legs. Wear these at all times for the first two weeks.   Mobilization: The best way to avoid a blood clot is to get up and walk. 10 times a day get up and walk for 5 minutes for the first two weeks. Walking for longer periods of time will increase pain and swelling, making therapy more difficult. If taking any long travel (car or plane) in the first 1-2 months, be sure to get up and walk at least every one hour.     Stool Softeners: You will be at greater risk of constipation after surgery because of being less mobile and taking the pain medications.   ? Take stool softeners as instructed by your surgeon while on pain medications. Use over the counter Colace 100 mg 1-2 capsules twice daily.   ? If stools become too loose or too frequent, decreases the dosage or stop the stool softener.  ? If constipation occurs despite use of stool softeners, you are to continue the stool softeners and add a laxative (Milk of Magnesia 1 ounce daily as needed).  ? Dulcolax oral tabs or suppository or a fleets enema can also be utilized for constipation and can be obtained over the counter.   ? If above interventions are unsuccessful in inducing bowel movements, please contact your family  physician's office/surgeon's office.  ? Drink plenty of fluids and eat fruits and vegetables during your recovery time    Pain Medications utilized after surgery are narcotics and the law requires that the following information be given to all patients that are prescribed narcotics:  ? CLASSIFICATION: Pain medications are called Opioids and are narcotics  ? LEGALITIES: It is illegal to share narcotics with others and to drive within 24 hours of taking narcotics  ? POTENTIAL SIDE EFFECTS: Potential side effects of opioids include: nausea, vomiting, itching, dizziness, drowsiness, dry mouth, constipation, and difficulty urinating.  ? POTENTIAL ADVERSE EFFECTS:   o Opioid tolerance can develop with use of pain medications and this simply means that it requires more and more of the medication to control pain; however, this is seen more in patients that use Opioids for longer periods of time.  o Opioid dependence can develop with use of Opioids and this simply means that to stop the medication can cause withdrawal symptoms; however, this is seen with patients that use Opioids for longer periods of time.  o Opioid addiction can develop with use of Opioids and the incidence of this is very unlikely in patients who take the medications as ordered and stop the medications as instructed.  o Opioid overdose can be dangerous, but is unlikely when the medication is taken as ordered and stopped when ordered. It is important not to mix opioids with alcohol or with any type of sedative, such as Benadryl, as this can lead to over sedation and respiratory difficulty.  ? DOSAGE:   o Pain medications may be needed consistently for the first week to decrease pain and promote adequate pain relief and participation in physical therapy.  o After the initial surgical pain begins to resolve, you may begin to decrease the pain medication and only take it as needed. By the end of 6 weeks, you should be off of pain medications.  o You can  decrease your pain medication consumption by slowly spacing out the time in between the medication, and using 650mg Tylenol when the pain is not as severe. Do not exceed 3500mg of Tylenol in 24 hours.   o Refills will not be given by the office during evening hours, on weekends, or after 6 weeks post-op.  o To seek refills on pain medications during the initial 6 week post-operative period, you must call the office 48 hours in advance to request the refill. The office will then notify you when to  the prescription. DO NOT wait until you are out of the medication to request a refill.

## 2020-01-10 NOTE — THERAPY DISCHARGE NOTE
Patient Name: Osvaldo Vences  : 1960    MRN: [id number]                              Today's Date: 1/10/2020       Admit Date: 2020    Visit Dx:     ICD-10-CM ICD-9-CM   1. Status post total replacement of right hip Z96.641 V43.64     Patient Active Problem List   Diagnosis   • Arthritis of right hip   • Status post total replacement of right hip   • Hyperlipidemia   • Hypertension   • Coronary artery disease   • Prediabetes   • Leukocytosis, likely reactive   • Acute blood loss anemia, mild, asymptomatic   • Acute postoperative pain     Past Medical History:   Diagnosis Date   • Arthritis    • Coronary artery disease    • History of heart attack    • Horseshoe kidney     history of   • Hypertension    • Wears glasses      Past Surgical History:   Procedure Laterality Date   • CARDIAC CATHETERIZATION     • COLONOSCOPY      over ten years ago since last colonoscopy ; info given    • CORONARY ANGIOPLASTY WITH STENT PLACEMENT     • KIDNEY SURGERY      horseshoe kidney correction surgery    • TOTAL HIP ARTHROPLASTY Right 2020    Procedure: TOTAL HIP ARTHROPLASTY ANTERIOR DIRECT RIGHT;  Surgeon: Colin Cevallos MD;  Location: [facility];  Service: Orthopedics     General Information     Row Name 01/10/20 0906          PT Evaluation Time/Intention    Document Type  therapy note (daily note)  -CS     Mode of Treatment  individual therapy;physical therapy  -CS     Row Name 01/10/20 0906          General Information    Patient Profile Reviewed?  yes  -CS     Existing Precautions/Restrictions  fall;right;hip, anterior  -CS     Row Name 01/10/20 0906          Cognitive Assessment/Intervention- PT/OT    Orientation Status (Cognition)  oriented x 4  -CS     Row Name 01/10/20 0906          Safety Issues, Functional Mobility    Safety Issues Affecting Function (Mobility)  safety precautions follow-through/compliance;safety precaution awareness  -CS     Impairments Affecting Function (Mobility)  endurance/activity  tolerance;pain;range of motion (ROM);strength  -CS       User Key  (r) = Recorded By, (t) = Taken By, (c) = Cosigned By    Initials Name Provider Type    Zenaida Orellana PT Physical Therapist        Mobility     Row Name 01/10/20 0906          Bed Mobility Assessment/Treatment    Bed Mobility Assessment/Treatment  supine-sit  -CS     [place] Level (Bed Mobility)  conditional independence;verbal cues  -CS     Supine-Sit [place] (Bed Mobility)  conditional independence;verbal cues  -CS     Assistive Device (Bed Mobility)  head of bed elevated  -CS     Comment (Bed Mobility)  VC's to move LE's to EOB and patient able to perform with HOB elevated and no assistance  -CS     Row Name 01/10/20 0906          Transfer Assessment/Treatment    Comment (Transfers)  VC's to push off of bed to stand and to reach back for chair to sit. VC's to step R LE out with transfers for comfort.   -CS     Row Name 01/10/20 0906          Sit-Stand Transfer    Sit-Stand [place] (Transfers)  conditional independence  -CS     Assistive Device (Sit-Stand Transfers)  walker, front-wheeled  -CS     Row Name 01/10/20 0906          Gait/Stairs Assessment/Training    43748 - Gait Training Minutes   15  -CS     Gait/Stairs Assessment/Training  gait/ambulation independence;gait/ambulation assistive device;distance ambulated;gait pattern  -CS     [place] Level (Gait)  contact guard  -CS     Assistive Device (Gait)  walker, front-wheeled  -CS     Distance in Feet (Gait)  700'  -CS     Pattern (Gait)  step-through  -CS     Deviations/Abnormal Patterns (Gait)  bilateral deviations;maia decreased;gait speed decreased;stride length decreased  -CS     Right Sided Gait Deviations  weight shift ability decreased  -CS     Comment (Gait/Stairs)  Pt able to amb 700' with RW CGA with step through gait mechanics. Pt initally had sensation of pins and needles in his R lateral thigh with ambulation that improved with gait. VC's to stay  within walker, increase WB on L LE, and to look up with ambulation. Pt able to ascend/descend 1 step with RW CGA with backward step up method with VC's on sequencing.   -CS     Row Name 01/10/20 0906          Mobility Assessment/Intervention    Extremity Weight-bearing Status  right lower extremity  -CS     Right Lower Extremity (Weight-bearing Status)  weight-bearing as tolerated (WBAT)  -CS       User Key  (r) = Recorded By, (t) = Taken By, (c) = Cosigned By    Initials Name Provider Type    Zenaida Orellana PT Physical Therapist        Obj/Interventions     Row Name 01/10/20 0906          Therapeutic Exercise    Lower Extremity (Therapeutic Exercise)  quad sets, right;LAQ (long arc quad), right;heel slides, right;SLR (straight leg raise), right  -CS     Lower Extremity Range of Motion (Therapeutic Exercise)  hip abduction/adduction, right;ankle dorsiflexion/plantar flexion, bilateral  -CS     Exercise Type (Therapeutic Exercise)  isometric contraction, static;AAROM (active assistive range of motion);AROM (active range of motion);isotonic contraction, concentric  -CS     Position (Therapeutic Exercise)  supine  -CS     Sets/Reps (Therapeutic Exercise)  15 reps each  -CS     Comment (Therapeutic Exercise)  VC's on technique   -CS     Row Name 01/10/20 0906          Sensory Assessment/Intervention    Sensory General Assessment  no sensation deficits identified  -CS       User Key  (r) = Recorded By, (t) = Taken By, (c) = Cosigned By    Initials Name Provider Type    Zenaida Orellana PT Physical Therapist        Goals/Plan     Row Name 01/10/20 0906          Bed Mobility Goal 1 (PT)    Activity/Assistive Device (Bed Mobility Goal 1, PT)  bed mobility activities, all  -CS     [place] Level/Cues Needed (Bed Mobility Goal 1, PT)  conditional independence  -CS     Time Frame (Bed Mobility Goal 1, PT)  long term goal (LTG);3 days  -CS     Progress/Outcomes (Bed Mobility Goal 1, PT)  goal met  -CS      Row Name 01/10/20 0906          Transfer Goal 1 (PT)    Activity/Assistive Device (Transfer Goal 1, PT)  sit-to-stand/stand-to-sit;bed-to-chair/chair-to-bed;walker, rolling  -CS     [place] Level/Cues Needed (Transfer Goal 1, PT)  conditional independence  -CS     Time Frame (Transfer Goal 1, PT)  long term goal (LTG);3 days  -CS     Progress/Outcome (Transfer Goal 1, PT)  goal met  -CS     [facility] Name 01/10/20 0906          Gait Training Goal 1 (PT)    Activity/Assistive Device (Gait Training Goal 1, PT)  gait (walking locomotion);assistive device use;walker, rolling  -CS     [place] Level (Gait Training Goal 1, PT)  conditional independence  -CS     Distance (Gait Goal 1, PT)  500'  -CS     Time Frame (Gait Training Goal 1, PT)  long term goal (LTG);3 days  -CS     Progress/Outcome (Gait Training Goal 1, PT)  goal partially met;discharged from facility  -CS     [facility] Name 01/10/20 0906          Stairs Goal 1 (PT)    Activity/Assistive Device (Stairs Goal 1, PT)  stairs, all skills;walker, rolling  -CS     [place] Level/Cues Needed (Stairs Goal 1, PT)  contact guard assist  -CS     Number of Stairs (Stairs Goal 1, PT)  1  -CS     Time Frame (Stairs Goal 1, PT)  long term goal (LTG);3 days  -CS     Progress/Outcome (Stairs Goal 1, PT)  goal met  -CS       User Key  (r) = Recorded By, (t) = Taken By, (c) = Cosigned By    Initials Name Provider Type    CS Zenaida Hugo, PT Physical Therapist        Clinical Impression     Row Name 01/10/20 0906          Pain Assessment    Additional Documentation  Pain Scale: Numbers Pre/Post-Treatment (Group)  -CS     [facility] Name 01/10/20 0906          Pain Scale: Numbers Pre/Post-Treatment    Pain Scale: Numbers, Pretreatment  1/10  -CS     Pain Scale: Numbers, Post-Treatment  1/10  -CS     Pain Location - Side  Right  -CS     Pain Location - Orientation  generalized  -CS     Pain Location  hip  -CS     Pain Intervention(s)  Repositioned;Ambulation/increased  activity;Cold applied  -CS     Row Name 01/10/20 0906          Plan of Care Review    Plan of Care Reviewed With  patient  -CS     Progress  improving  -CS     Outcome Summary  Pt able to amb 700' with RW CGA with step through gait mechanics. Pt limited by fatigue. Pt able to ascend/descend 1 step with RW CGA. Progressed patient's HEP and patient tolerated well. Recommend home with assist and home health PT.   -CS     Row Name 01/10/20 0906          Physical Therapy Clinical Impression    Criteria for Skilled Interventions Met (PT Clinical Impression)  yes;treatment indicated;other (see comments) Pt being discharged home today  -CS     Rehab Potential (PT Clinical Summary)  good, to achieve stated therapy goals  -CS     Row Name 01/10/20 0906          Positioning and Restraints    Pre-Treatment Position  in bed  -CS     Post Treatment Position  chair  -CS     In Chair  notified nsg;reclined;call light within reach;encouraged to call for assist;exit alarm on;with family/caregiver;legs elevated  -CS       User Key  (r) = Recorded By, (t) = Taken By, (c) = Cosigned By    Initials Name Provider Type    CS Zenaida Hugo, PT Physical Therapist        Outcome Measures     Row Name 01/10/20 0906          How much help from another person do you currently need...    Turning from your back to your side while in flat bed without using bedrails?  4  -CS     Moving from lying on back to sitting on the side of a flat bed without bedrails?  4  -CS     Moving to and from a bed to a chair (including a wheelchair)?  4  -CS     Standing up from a chair using your arms (e.g., wheelchair, bedside chair)?  4  -CS     Climbing 3-5 steps with a railing?  3  -CS     To walk in hospital room?  3  -CS     AM-PAC 6 Clicks Score (PT)  22  -CS     Row Name 01/10/20 0906          Functional Assessment    Outcome Measure Options  AM-PAC 6 Clicks Basic Mobility (PT)  -CS       User Key  (r) = Recorded By, (t) = Taken By, (c) = Cosigned By     Initials Name Provider Type    Zenaida Orellana PT Physical Therapist        Physical Therapy Education                 Title: PT OT SLP Therapies (Done)     Topic: Physical Therapy (Done)     Point: Mobility training (Done)     Description:   Instruct learner(s) on safety and technique for assisting patient out of bed, chair or wheelchair.  Instruct in the proper use of assistive devices, such as walker, crutches, cane or brace.              Patient Friendly Description:   It's important to get you on your feet again, but we need to do so in a way that is safe for you. Falling has serious consequences, and your personal safety is the most important thing of all.        When it's time to get out of bed, one of us or a family member will sit next to you on the bed to give you support.     If your doctor or nurse tells you to use a walker, crutches, a cane, or a brace, be sure you use it every time you get out of bed, even if you think you don't need it.    Learning Progress Summary           Patient Acceptance, E,D,H, VU,DU by  at 1/10/2020 0906    Comment:  Educated patient on safe hand placement with transfers, gait mechanics, sequencing with stairs, car transfers, HEP and provided handout    Acceptance, E,TB, VU by TANISHA at 1/9/2020 1720    Comment:  Educated patient on anterior hip precaution, WB status, safe hand placement with transfers, gait mechanics, and plan for progression of care.   Significant Other Acceptance, E,TB, VU by TANISHA at 1/9/2020 1720    Comment:  Educated patient on anterior hip precaution, WB status, safe hand placement with transfers, gait mechanics, and plan for progression of care.                   Point: Home exercise program (Done)     Description:   Instruct learner(s) on appropriate technique for monitoring, assisting and/or progressing patient with therapeutic exercises and activities.              Learning Progress Summary           Patient Acceptance, E,D,H, VU,DU by  at  1/10/2020 0906    Comment:  Educated patient on safe hand placement with transfers, gait mechanics, sequencing with stairs, car transfers, HEP and provided handout    Acceptance, E,TB, VU by CS at 1/9/2020 1720    Comment:  Educated patient on anterior hip precaution, WB status, safe hand placement with transfers, gait mechanics, and plan for progression of care.   Significant Other Acceptance, E,TB, VU by CS at 1/9/2020 1720    Comment:  Educated patient on anterior hip precaution, WB status, safe hand placement with transfers, gait mechanics, and plan for progression of care.                   Point: Body mechanics (Done)     Description:   Instruct learner(s) on proper positioning and spine alignment for patient and/or caregiver during mobility tasks and/or exercises.              Learning Progress Summary           Patient Acceptance, E,D,H, VU,DU by CS at 1/10/2020 0906    Comment:  Educated patient on safe hand placement with transfers, gait mechanics, sequencing with stairs, car transfers, HEP and provided handout    Acceptance, E,TB, VU by CS at 1/9/2020 1720    Comment:  Educated patient on anterior hip precaution, WB status, safe hand placement with transfers, gait mechanics, and plan for progression of care.   Significant Other Acceptance, E,TB, VU by CS at 1/9/2020 1720    Comment:  Educated patient on anterior hip precaution, WB status, safe hand placement with transfers, gait mechanics, and plan for progression of care.                   Point: Precautions (Done)     Description:   Instruct learner(s) on prescribed precautions during mobility and gait tasks              Learning Progress Summary           Patient Acceptance, E,D,H, VU,DU by CS at 1/10/2020 0906    Comment:  Educated patient on safe hand placement with transfers, gait mechanics, sequencing with stairs, car transfers, HEP and provided handout    Acceptance, E,TB, VU by CS at 1/9/2020 1720    Comment:  Educated patient on anterior hip  precaution, WB status, safe hand placement with transfers, gait mechanics, and plan for progression of care.   Significant Other Acceptance, E,TB, VU by  at 1/9/2020 1720    Comment:  Educated patient on anterior hip precaution, WB status, safe hand placement with transfers, gait mechanics, and plan for progression of care.                               User Key     Initials Effective Dates Name Provider Type Discipline     03/26/19 -  Zenaida Hugo, PT Physical Therapist PT              PT Recommendation and Plan  Planned Therapy Interventions (PT Eval): balance training, bed mobility training, home exercise program, gait training, neuromuscular re-education, patient/family education, ROM (range of motion), strengthening, stair training, transfer training  Outcome Summary/Treatment Plan (PT)  Anticipated Equipment Needs at Discharge (PT): other (see comments)(none)  Anticipated Discharge Disposition (PT): home with assist, home with home health  Plan of Care Reviewed With: patient  Progress: improving  Outcome Summary: Pt able to amb 700' with RW CGA with step through gait mechanics. Pt limited by fatigue. Pt able to ascend/descend 1 step with RW CGA. Progressed patient's HEP and patient tolerated well. Recommend home with assist and home health PT.      Time Calculation:   PT Charges     Row Name 01/10/20 0906             Time Calculation    Start Time  0906  -      PT Received On  01/10/20  -         Time Calculation- PT    Total Timed Code Minutes- PT  25 minute(s)  -         Timed Charges    61074 - PT Therapeutic Exercise Minutes  10  -CS      61837 - Gait Training Minutes   15  -        User Key  (r) = Recorded By, (t) = Taken By, (c) = Cosigned By    Initials Name Provider Type     Zenaida Hugo, PT Physical Therapist        Therapy Charges for Today     Code Description Service Date Service Provider Modifiers Qty    83010647439 HC GAIT TRAINING EA 15 MIN 1/9/2020 [place]  Zenaida, PT GP 1    07272228034 HC PT EVAL MOD COMPLEXITY 3 1/9/2020 Zenaida Hugo, PT GP 1    34823276574 HC PT THER SUPP EA 15 MIN 1/9/2020 Zenaida uHgo, PT GP 2    02461610867 HC PT THER PROC EA 15 MIN 1/10/2020 Zenaida Hugo, PT GP 1    29896156741 HC GAIT TRAINING EA 15 MIN 1/10/2020 Zenaida Hugo, PT GP 1          PT G-Codes  Outcome Measure Options: AM-PAC 6 Clicks Basic Mobility (PT)  AM-PAC 6 Clicks Score (PT): 22    Zenaida Hugo, PT  1/10/2020

## 2020-01-12 LAB
COTININE UR-MCNC: NORMAL NG/ML
NICOTINE SERPL-MCNC: NORMAL NG/ML

## 2022-07-20 ENCOUNTER — APPOINTMENT (OUTPATIENT)
Dept: PREADMISSION TESTING | Facility: HOSPITAL | Age: 62
End: 2022-07-20

## 2022-10-21 ENCOUNTER — PRE-ADMISSION TESTING (OUTPATIENT)
Dept: PREADMISSION TESTING | Facility: HOSPITAL | Age: 62
End: 2022-10-21

## 2022-10-21 VITALS — BODY MASS INDEX: 35.38 KG/M2 | HEIGHT: 68 IN | WEIGHT: 233.47 LBS

## 2022-10-21 LAB
25(OH)D3 SERPL-MCNC: 24.5 NG/ML (ref 30–100)
ALBUMIN SERPL-MCNC: 4.6 G/DL (ref 3.5–5.2)
ALBUMIN/GLOB SERPL: 2 G/DL
ALP SERPL-CCNC: 79 U/L (ref 39–117)
ALT SERPL W P-5'-P-CCNC: 26 U/L (ref 1–41)
ANION GAP SERPL CALCULATED.3IONS-SCNC: 12 MMOL/L (ref 5–15)
APTT PPP: 30.9 SECONDS (ref 22–39)
AST SERPL-CCNC: 17 U/L (ref 1–40)
BASOPHILS # BLD AUTO: 0.03 10*3/MM3 (ref 0–0.2)
BASOPHILS NFR BLD AUTO: 0.6 % (ref 0–1.5)
BILIRUB SERPL-MCNC: 0.3 MG/DL (ref 0–1.2)
BUN SERPL-MCNC: 16 MG/DL (ref 8–23)
BUN/CREAT SERPL: 16.5 (ref 7–25)
CALCIUM SPEC-SCNC: 9.3 MG/DL (ref 8.6–10.5)
CHLORIDE SERPL-SCNC: 105 MMOL/L (ref 98–107)
CO2 SERPL-SCNC: 26 MMOL/L (ref 22–29)
CREAT SERPL-MCNC: 0.97 MG/DL (ref 0.76–1.27)
CRP SERPL-MCNC: <0.3 MG/DL (ref 0–0.5)
DEPRECATED RDW RBC AUTO: 43.8 FL (ref 37–54)
EGFRCR SERPLBLD CKD-EPI 2021: 87.2 ML/MIN/1.73
EOSINOPHIL # BLD AUTO: 0.08 10*3/MM3 (ref 0–0.4)
EOSINOPHIL NFR BLD AUTO: 1.5 % (ref 0.3–6.2)
ERYTHROCYTE [DISTWIDTH] IN BLOOD BY AUTOMATED COUNT: 13.6 % (ref 12.3–15.4)
ERYTHROCYTE [SEDIMENTATION RATE] IN BLOOD: 19 MM/HR (ref 0–20)
GLOBULIN UR ELPH-MCNC: 2.3 GM/DL
GLUCOSE SERPL-MCNC: 168 MG/DL (ref 65–99)
HBA1C MFR BLD: 6.1 % (ref 4.8–5.6)
HCT VFR BLD AUTO: 40.9 % (ref 37.5–51)
HGB BLD-MCNC: 13.4 G/DL (ref 13–17.7)
IMM GRANULOCYTES # BLD AUTO: 0.03 10*3/MM3 (ref 0–0.05)
IMM GRANULOCYTES NFR BLD AUTO: 0.6 % (ref 0–0.5)
INR PPP: 0.99 (ref 0.84–1.13)
LYMPHOCYTES # BLD AUTO: 1.46 10*3/MM3 (ref 0.7–3.1)
LYMPHOCYTES NFR BLD AUTO: 27 % (ref 19.6–45.3)
MCH RBC QN AUTO: 29.1 PG (ref 26.6–33)
MCHC RBC AUTO-ENTMCNC: 32.8 G/DL (ref 31.5–35.7)
MCV RBC AUTO: 88.7 FL (ref 79–97)
MONOCYTES # BLD AUTO: 0.31 10*3/MM3 (ref 0.1–0.9)
MONOCYTES NFR BLD AUTO: 5.7 % (ref 5–12)
NEUTROPHILS NFR BLD AUTO: 3.5 10*3/MM3 (ref 1.7–7)
NEUTROPHILS NFR BLD AUTO: 64.6 % (ref 42.7–76)
NRBC BLD AUTO-RTO: 0 /100 WBC (ref 0–0.2)
PLATELET # BLD AUTO: 197 10*3/MM3 (ref 140–450)
PMV BLD AUTO: 10.4 FL (ref 6–12)
POTASSIUM SERPL-SCNC: 3.7 MMOL/L (ref 3.5–5.2)
PROT SERPL-MCNC: 6.9 G/DL (ref 6–8.5)
PROTHROMBIN TIME: 13 SECONDS (ref 11.4–14.4)
QT INTERVAL: 434 MS
QTC INTERVAL: 422 MS
RBC # BLD AUTO: 4.61 10*6/MM3 (ref 4.14–5.8)
SODIUM SERPL-SCNC: 143 MMOL/L (ref 136–145)
WBC NRBC COR # BLD: 5.41 10*3/MM3 (ref 3.4–10.8)

## 2022-10-21 PROCEDURE — 80053 COMPREHEN METABOLIC PANEL: CPT

## 2022-10-21 PROCEDURE — 85652 RBC SED RATE AUTOMATED: CPT

## 2022-10-21 PROCEDURE — 85025 COMPLETE CBC W/AUTO DIFF WBC: CPT

## 2022-10-21 PROCEDURE — 36415 COLL VENOUS BLD VENIPUNCTURE: CPT

## 2022-10-21 PROCEDURE — 85610 PROTHROMBIN TIME: CPT

## 2022-10-21 PROCEDURE — 82985 ASSAY OF GLYCATED PROTEIN: CPT

## 2022-10-21 PROCEDURE — 82306 VITAMIN D 25 HYDROXY: CPT

## 2022-10-21 PROCEDURE — 85730 THROMBOPLASTIN TIME PARTIAL: CPT

## 2022-10-21 PROCEDURE — G0480 DRUG TEST DEF 1-7 CLASSES: HCPCS

## 2022-10-21 PROCEDURE — 93005 ELECTROCARDIOGRAM TRACING: CPT

## 2022-10-21 PROCEDURE — 83036 HEMOGLOBIN GLYCOSYLATED A1C: CPT

## 2022-10-21 PROCEDURE — 86140 C-REACTIVE PROTEIN: CPT

## 2022-10-21 PROCEDURE — 87081 CULTURE SCREEN ONLY: CPT

## 2022-10-21 PROCEDURE — 93010 ELECTROCARDIOGRAM REPORT: CPT | Performed by: INTERNAL MEDICINE

## 2022-10-21 RX ORDER — MELOXICAM 15 MG/1
15 TABLET ORAL DAILY
COMMUNITY
Start: 2022-05-24

## 2022-10-21 RX ORDER — POTASSIUM CHLORIDE 750 MG/1
10 TABLET, FILM COATED, EXTENDED RELEASE ORAL
COMMUNITY
Start: 2022-10-03 | End: 2023-10-03

## 2022-10-21 RX ORDER — MONTELUKAST SODIUM 10 MG/1
10 TABLET ORAL NIGHTLY
COMMUNITY
Start: 2022-09-28 | End: 2023-09-28

## 2022-10-21 RX ORDER — CETIRIZINE HYDROCHLORIDE 10 MG/1
10 TABLET ORAL DAILY
COMMUNITY
Start: 2022-06-22 | End: 2023-06-22

## 2022-10-21 RX ORDER — TICAGRELOR 90 MG/1
90 TABLET ORAL 2 TIMES DAILY
COMMUNITY
Start: 2022-07-14

## 2022-10-21 RX ORDER — ATORVASTATIN CALCIUM 40 MG/1
40 TABLET, FILM COATED ORAL NIGHTLY
COMMUNITY
Start: 2022-09-28 | End: 2022-11-04 | Stop reason: HOSPADM

## 2022-10-21 RX ORDER — VALSARTAN AND HYDROCHLOROTHIAZIDE 160; 12.5 MG/1; MG/1
1 TABLET, FILM COATED ORAL NIGHTLY
COMMUNITY
Start: 2022-09-07

## 2022-10-21 RX ORDER — METOPROLOL TARTRATE 50 MG/1
25 TABLET, FILM COATED ORAL
Status: ON HOLD | COMMUNITY
Start: 2022-09-28 | End: 2022-11-03

## 2022-10-21 RX ORDER — FLUTICASONE PROPIONATE 50 MCG
2 SPRAY, SUSPENSION (ML) NASAL
COMMUNITY
Start: 2022-04-13 | End: 2023-04-13

## 2022-10-21 NOTE — PAT
An arrival time for procedure was not provided during PAT visit. If patient had any questions or concerns about their arrival time, they were instructed to contact their surgeon/physician.  Additionally, if the patient referred to an arrival time that was acquired from their my chart account, patient was encouraged to verify that time with their surgeon/physician. Arrival times are NOT provided in Pre Admission Testing Department.    Discussed with patient options for receiving total joint replacement education and assessed patient's ability and preference. Joint Replacement Guide given to patient during PAT visit since not received a copy within the last year. Encouraged patient/family to read guide thoroughly and notify PAT staff with any questions or concerns. Handout provided directing patient to links to watch online videos related to joint replacement surgery on the [facility] website. The handout gives detailed instructions for joining an online joint replacement class through Zoom or phone conference offered on . Patient agreed to participate by watching videos online. Patient verbalized understanding of instructions and to complete the online learning tool survey. Encouraged to share information with family and/or . An overview of the joint replacement education was provided during the visit including general perioperative instructions that are routine for all surgical patients (PAT PASS, wipes, directions to pre-op, etc.).    Patient to apply Chlorhexadine wipes  to surgical area (as instructed) the night before procedure and the AM of procedure. Wipes provided.    Patient instructed to drink 20 ounces of Gatorade and it needs to be completed 1 hour (for Main OR patients) or 2 hours (scheduled  section & [facility] patients) before given arrival time for procedure (NO RED Gatorade)    Patient verbalized understanding.    Per Anesthesia Request, patient instructed not to take their  ACE/ARB medications on the AM of surgery.    An arrival time for procedure was not provided during PAT visit. If patient had any questions or concerns about their arrival time, they were instructed to contact their surgeon/physician.  Additionally, if the patient referred to an arrival time that was acquired from their my chart account, patient was encouraged to verify that time with their surgeon/physician. Arrival times are NOT provided in Pre Admission Testing Department.    Patient denies any current skin issues.     Clean catch urinalysis not indicated because patient denied recent urinary frequency, urinary urgency, burning/pain upon urination, or flank pain. No recent UTIs.    Patient stated that he had cardiac clearance for surgery with Dr. Lopez two weeks prior to PAT appointment and that Dr. Cevallos's office had a copy of the clearance. RN called Dr. Cevallos's office, they stated they had not yet received a copy from Dr. Lopez. Notified office staff to please fax a copy to PAT once received, verbalized  Understanding.

## 2022-10-22 LAB
FRUCTOSAMINE SERPL-SCNC: 205 UMOL/L (ref 0–285)
MRSA SPEC QL CULT: NORMAL

## 2022-10-29 LAB
COTININE SERPL-MCNC: <1 NG/ML
NICOTINE SERPL-MCNC: <1 NG/ML

## 2022-11-02 ENCOUNTER — ANESTHESIA EVENT (OUTPATIENT)
Dept: PERIOP | Facility: HOSPITAL | Age: 62
End: 2022-11-02

## 2022-11-02 RX ORDER — FAMOTIDINE 10 MG/ML
20 INJECTION, SOLUTION INTRAVENOUS ONCE
Status: CANCELLED | OUTPATIENT
Start: 2022-11-02 | End: 2022-11-02

## 2022-11-03 ENCOUNTER — APPOINTMENT (OUTPATIENT)
Dept: GENERAL RADIOLOGY | Facility: HOSPITAL | Age: 62
End: 2022-11-03

## 2022-11-03 ENCOUNTER — HOSPITAL ENCOUNTER (OUTPATIENT)
Facility: HOSPITAL | Age: 62
Discharge: HOME | End: 2022-11-04
Attending: ORTHOPAEDIC SURGERY | Admitting: ORTHOPAEDIC SURGERY

## 2022-11-03 ENCOUNTER — ANESTHESIA (OUTPATIENT)
Dept: PERIOP | Facility: HOSPITAL | Age: 62
End: 2022-11-03

## 2022-11-03 DIAGNOSIS — E66.9 OBESITY (BMI 30-39.9): ICD-10-CM

## 2022-11-03 DIAGNOSIS — D62 ACUTE BLOOD LOSS ANEMIA: ICD-10-CM

## 2022-11-03 DIAGNOSIS — M16.12 ARTHRITIS OF LEFT HIP: ICD-10-CM

## 2022-11-03 DIAGNOSIS — Z96.641 STATUS POST TOTAL REPLACEMENT OF RIGHT HIP: ICD-10-CM

## 2022-11-03 DIAGNOSIS — R73.03 PREDIABETES: ICD-10-CM

## 2022-11-03 DIAGNOSIS — M16.11 ARTHRITIS OF RIGHT HIP: ICD-10-CM

## 2022-11-03 DIAGNOSIS — Z96.642 STATUS POST TOTAL HIP REPLACEMENT, LEFT: Primary | ICD-10-CM

## 2022-11-03 DIAGNOSIS — G89.18 ACUTE POSTOPERATIVE PAIN: ICD-10-CM

## 2022-11-03 LAB — POTASSIUM SERPL-SCNC: 4 MMOL/L (ref 3.5–5.2)

## 2022-11-03 PROCEDURE — 25010000002 ONDANSETRON PER 1 MG: Performed by: INTERNAL MEDICINE

## 2022-11-03 PROCEDURE — 25010000002 ONDANSETRON PER 1 MG: Performed by: ANESTHESIOLOGY

## 2022-11-03 PROCEDURE — 25010000002 KETOROLAC TROMETHAMINE PER 15 MG: Performed by: ORTHOPAEDIC SURGERY

## 2022-11-03 PROCEDURE — 25010000002 CLONIDINE PER 1 MG: Performed by: ORTHOPAEDIC SURGERY

## 2022-11-03 PROCEDURE — 25010000002 DEXAMETHASONE PER 1 MG: Performed by: ANESTHESIOLOGY

## 2022-11-03 PROCEDURE — C1776 JOINT DEVICE (IMPLANTABLE): HCPCS | Performed by: ORTHOPAEDIC SURGERY

## 2022-11-03 PROCEDURE — 25010000002 FENTANYL CITRATE (PF) 50 MCG/ML SOLUTION

## 2022-11-03 PROCEDURE — 25010000002 VANCOMYCIN 1 G RECONSTITUTED SOLUTION: Performed by: ORTHOPAEDIC SURGERY

## 2022-11-03 PROCEDURE — 25010000002 PROPOFOL 10 MG/ML EMULSION: Performed by: ANESTHESIOLOGY

## 2022-11-03 PROCEDURE — C1713 ANCHOR/SCREW BN/BN,TIS/BN: HCPCS | Performed by: ORTHOPAEDIC SURGERY

## 2022-11-03 PROCEDURE — 97110 THERAPEUTIC EXERCISES: CPT

## 2022-11-03 PROCEDURE — 97161 PT EVAL LOW COMPLEX 20 MIN: CPT

## 2022-11-03 PROCEDURE — 84132 ASSAY OF SERUM POTASSIUM: CPT | Performed by: ANESTHESIOLOGY

## 2022-11-03 PROCEDURE — 25010000002 ONDANSETRON PER 1 MG

## 2022-11-03 PROCEDURE — 76000 FLUOROSCOPY <1 HR PHYS/QHP: CPT

## 2022-11-03 PROCEDURE — 73502 X-RAY EXAM HIP UNI 2-3 VIEWS: CPT

## 2022-11-03 PROCEDURE — 25010000002 CEFAZOLIN PER 500 MG: Performed by: ORTHOPAEDIC SURGERY

## 2022-11-03 PROCEDURE — 25010000002 FENTANYL CITRATE (PF) 100 MCG/2ML SOLUTION: Performed by: ANESTHESIOLOGY

## 2022-11-03 DEVICE — STEM FEM/HIP TAPERLOC COMPL DIST/REDUC PPS OFFST/HI SZ10: Type: IMPLANTABLE DEVICE | Site: HIP | Status: FUNCTIONAL

## 2022-11-03 DEVICE — LINER ACET G7 NTRL E1 SZE 36MM: Type: IMPLANTABLE DEVICE | Site: HIP | Status: FUNCTIONAL

## 2022-11-03 DEVICE — DEV CONTRL TISS STRATAFIX SYMM PDS PLUS VIL CT-1 45CM: Type: IMPLANTABLE DEVICE | Site: HIP | Status: FUNCTIONAL

## 2022-11-03 DEVICE — SCRW ACET CORT TRILOGY S/TAP 6.5X25: Type: IMPLANTABLE DEVICE | Site: HIP | Status: FUNCTIONAL

## 2022-11-03 DEVICE — HD MOD FEM/HIP G7 BIOLOX/DELTA TYP1 CERAM 36MM MIN3: Type: IMPLANTABLE DEVICE | Site: HIP | Status: FUNCTIONAL

## 2022-11-03 DEVICE — CP HIP UPCHRG OSSEOTI LTD HL CUPS: Type: IMPLANTABLE DEVICE | Site: HIP | Status: FUNCTIONAL

## 2022-11-03 DEVICE — TOTAL HIP PRIMARY: Type: IMPLANTABLE DEVICE | Site: HIP | Status: FUNCTIONAL

## 2022-11-03 DEVICE — SUT NONABS MAXBRAID/PE NMBR2 HC5 38IN WHT 900333: Type: IMPLANTABLE DEVICE | Site: HIP | Status: FUNCTIONAL

## 2022-11-03 DEVICE — SHLL ACET OSSEOTI G7 3H SZE 52MM: Type: IMPLANTABLE DEVICE | Site: HIP | Status: FUNCTIONAL

## 2022-11-03 RX ORDER — MAGNESIUM HYDROXIDE 1200 MG/15ML
LIQUID ORAL AS NEEDED
Status: DISCONTINUED | OUTPATIENT
Start: 2022-11-03 | End: 2022-11-03 | Stop reason: HOSPADM

## 2022-11-03 RX ORDER — TRANEXAMIC ACID 10 MG/ML
1000 INJECTION, SOLUTION INTRAVENOUS ONCE
Status: COMPLETED | OUTPATIENT
Start: 2022-11-03 | End: 2022-11-03

## 2022-11-03 RX ORDER — FENTANYL CITRATE 50 UG/ML
INJECTION, SOLUTION INTRAMUSCULAR; INTRAVENOUS
Status: COMPLETED
Start: 2022-11-03 | End: 2022-11-03

## 2022-11-03 RX ORDER — ONDANSETRON 2 MG/ML
4 INJECTION INTRAMUSCULAR; INTRAVENOUS ONCE AS NEEDED
Status: COMPLETED | OUTPATIENT
Start: 2022-11-03 | End: 2022-11-03

## 2022-11-03 RX ORDER — DEXAMETHASONE SODIUM PHOSPHATE 4 MG/ML
INJECTION, SOLUTION INTRA-ARTICULAR; INTRALESIONAL; INTRAMUSCULAR; INTRAVENOUS; SOFT TISSUE AS NEEDED
Status: DISCONTINUED | OUTPATIENT
Start: 2022-11-03 | End: 2022-11-03 | Stop reason: SURG

## 2022-11-03 RX ORDER — ACETAMINOPHEN 325 MG/1
650 TABLET ORAL ONCE AS NEEDED
Status: DISCONTINUED | OUTPATIENT
Start: 2022-11-03 | End: 2022-11-03

## 2022-11-03 RX ORDER — CEFAZOLIN SODIUM IN 0.9 % NACL 2 G/100 ML
2 PLASTIC BAG, INJECTION (ML) INTRAVENOUS ONCE
Status: COMPLETED | OUTPATIENT
Start: 2022-11-03 | End: 2022-11-03

## 2022-11-03 RX ORDER — HYDROMORPHONE HYDROCHLORIDE 1 MG/ML
0.5 INJECTION, SOLUTION INTRAMUSCULAR; INTRAVENOUS; SUBCUTANEOUS
Status: DISCONTINUED | OUTPATIENT
Start: 2022-11-03 | End: 2022-11-04 | Stop reason: HOSPADM

## 2022-11-03 RX ORDER — SODIUM CHLORIDE 0.9 % (FLUSH) 0.9 %
10 SYRINGE (ML) INJECTION AS NEEDED
Status: DISCONTINUED | OUTPATIENT
Start: 2022-11-03 | End: 2022-11-03 | Stop reason: HOSPADM

## 2022-11-03 RX ORDER — TRAMADOL HYDROCHLORIDE 50 MG/1
50 TABLET ORAL EVERY 8 HOURS PRN
Status: DISCONTINUED | OUTPATIENT
Start: 2022-11-03 | End: 2022-11-04 | Stop reason: HOSPADM

## 2022-11-03 RX ORDER — LIDOCAINE HYDROCHLORIDE 10 MG/ML
INJECTION, SOLUTION EPIDURAL; INFILTRATION; INTRACAUDAL; PERINEURAL AS NEEDED
Status: DISCONTINUED | OUTPATIENT
Start: 2022-11-03 | End: 2022-11-03 | Stop reason: SURG

## 2022-11-03 RX ORDER — MONTELUKAST SODIUM 10 MG/1
10 TABLET ORAL NIGHTLY
Status: DISCONTINUED | OUTPATIENT
Start: 2022-11-03 | End: 2022-11-04 | Stop reason: HOSPADM

## 2022-11-03 RX ORDER — ATORVASTATIN CALCIUM 40 MG/1
80 TABLET, FILM COATED ORAL NIGHTLY
Status: DISCONTINUED | OUTPATIENT
Start: 2022-11-03 | End: 2022-11-04 | Stop reason: HOSPADM

## 2022-11-03 RX ORDER — ONDANSETRON 2 MG/ML
INJECTION INTRAMUSCULAR; INTRAVENOUS
Status: COMPLETED
Start: 2022-11-03 | End: 2022-11-03

## 2022-11-03 RX ORDER — ROCURONIUM BROMIDE 10 MG/ML
INJECTION, SOLUTION INTRAVENOUS AS NEEDED
Status: DISCONTINUED | OUTPATIENT
Start: 2022-11-03 | End: 2022-11-03 | Stop reason: SURG

## 2022-11-03 RX ORDER — KETOROLAC TROMETHAMINE 15 MG/ML
15 INJECTION, SOLUTION INTRAMUSCULAR; INTRAVENOUS EVERY 6 HOURS PRN
Status: DISCONTINUED | OUTPATIENT
Start: 2022-11-03 | End: 2022-11-04 | Stop reason: HOSPADM

## 2022-11-03 RX ORDER — MELOXICAM 15 MG/1
15 TABLET ORAL DAILY
Status: DISCONTINUED | OUTPATIENT
Start: 2022-11-04 | End: 2022-11-04 | Stop reason: HOSPADM

## 2022-11-03 RX ORDER — LABETALOL HYDROCHLORIDE 5 MG/ML
5 INJECTION, SOLUTION INTRAVENOUS
Status: DISCONTINUED | OUTPATIENT
Start: 2022-11-03 | End: 2022-11-03

## 2022-11-03 RX ORDER — ACETAMINOPHEN 500 MG
1000 TABLET ORAL ONCE
Status: COMPLETED | OUTPATIENT
Start: 2022-11-03 | End: 2022-11-03

## 2022-11-03 RX ORDER — LISINOPRIL 10 MG/1
10 TABLET ORAL EVERY EVENING
Status: DISCONTINUED | OUTPATIENT
Start: 2022-11-03 | End: 2022-11-04 | Stop reason: HOSPADM

## 2022-11-03 RX ORDER — FAMOTIDINE 20 MG/1
20 TABLET, FILM COATED ORAL ONCE
Status: COMPLETED | OUTPATIENT
Start: 2022-11-03 | End: 2022-11-03

## 2022-11-03 RX ORDER — PREGABALIN 75 MG/1
75 CAPSULE ORAL ONCE
Status: COMPLETED | OUTPATIENT
Start: 2022-11-03 | End: 2022-11-03

## 2022-11-03 RX ORDER — ONDANSETRON 2 MG/ML
INJECTION INTRAMUSCULAR; INTRAVENOUS AS NEEDED
Status: DISCONTINUED | OUTPATIENT
Start: 2022-11-03 | End: 2022-11-03 | Stop reason: SURG

## 2022-11-03 RX ORDER — NALOXONE HCL 0.4 MG/ML
0.1 VIAL (ML) INJECTION
Status: DISCONTINUED | OUTPATIENT
Start: 2022-11-03 | End: 2022-11-04 | Stop reason: HOSPADM

## 2022-11-03 RX ORDER — METOPROLOL SUCCINATE 25 MG/1
25 TABLET, EXTENDED RELEASE ORAL EVERY EVENING
Status: DISCONTINUED | OUTPATIENT
Start: 2022-11-03 | End: 2022-11-04 | Stop reason: HOSPADM

## 2022-11-03 RX ORDER — MIDAZOLAM HYDROCHLORIDE 1 MG/ML
1 INJECTION INTRAMUSCULAR; INTRAVENOUS
Status: DISCONTINUED | OUTPATIENT
Start: 2022-11-03 | End: 2022-11-03 | Stop reason: HOSPADM

## 2022-11-03 RX ORDER — OXYCODONE HYDROCHLORIDE 5 MG/1
5 TABLET ORAL ONCE AS NEEDED
Status: DISCONTINUED | OUTPATIENT
Start: 2022-11-03 | End: 2022-11-03

## 2022-11-03 RX ORDER — OXYCODONE HYDROCHLORIDE 5 MG/1
5 TABLET ORAL EVERY 4 HOURS PRN
Status: DISCONTINUED | OUTPATIENT
Start: 2022-11-03 | End: 2022-11-04 | Stop reason: HOSPADM

## 2022-11-03 RX ORDER — MELOXICAM 15 MG/1
15 TABLET ORAL ONCE
Status: COMPLETED | OUTPATIENT
Start: 2022-11-03 | End: 2022-11-03

## 2022-11-03 RX ORDER — BUPIVACAINE HCL/0.9 % NACL/PF 0.1 %
2 PLASTIC BAG, INJECTION (ML) EPIDURAL EVERY 8 HOURS
Status: COMPLETED | OUTPATIENT
Start: 2022-11-03 | End: 2022-11-04

## 2022-11-03 RX ORDER — ACETAMINOPHEN 500 MG
1000 TABLET ORAL EVERY 8 HOURS SCHEDULED
Status: DISCONTINUED | OUTPATIENT
Start: 2022-11-03 | End: 2022-11-04 | Stop reason: HOSPADM

## 2022-11-03 RX ORDER — LABETALOL HYDROCHLORIDE 5 MG/ML
10 INJECTION, SOLUTION INTRAVENOUS EVERY 4 HOURS PRN
Status: DISCONTINUED | OUTPATIENT
Start: 2022-11-03 | End: 2022-11-04 | Stop reason: HOSPADM

## 2022-11-03 RX ORDER — ACETAMINOPHEN 500 MG
TABLET ORAL
Status: COMPLETED
Start: 2022-11-03 | End: 2022-11-03

## 2022-11-03 RX ORDER — CETIRIZINE HYDROCHLORIDE 10 MG/1
10 TABLET ORAL NIGHTLY
Status: DISCONTINUED | OUTPATIENT
Start: 2022-11-03 | End: 2022-11-04 | Stop reason: HOSPADM

## 2022-11-03 RX ORDER — SODIUM CHLORIDE, SODIUM LACTATE, POTASSIUM CHLORIDE, CALCIUM CHLORIDE 600; 310; 30; 20 MG/100ML; MG/100ML; MG/100ML; MG/100ML
9 INJECTION, SOLUTION INTRAVENOUS CONTINUOUS
Status: DISCONTINUED | OUTPATIENT
Start: 2022-11-03 | End: 2022-11-04 | Stop reason: HOSPADM

## 2022-11-03 RX ORDER — OXYCODONE HYDROCHLORIDE 5 MG/1
10 TABLET ORAL EVERY 4 HOURS PRN
Status: DISCONTINUED | OUTPATIENT
Start: 2022-11-03 | End: 2022-11-04 | Stop reason: HOSPADM

## 2022-11-03 RX ORDER — FENTANYL CITRATE 50 UG/ML
50 INJECTION, SOLUTION INTRAMUSCULAR; INTRAVENOUS
Status: COMPLETED | OUTPATIENT
Start: 2022-11-03 | End: 2022-11-03

## 2022-11-03 RX ORDER — FENTANYL CITRATE 50 UG/ML
INJECTION, SOLUTION INTRAMUSCULAR; INTRAVENOUS AS NEEDED
Status: DISCONTINUED | OUTPATIENT
Start: 2022-11-03 | End: 2022-11-03 | Stop reason: SURG

## 2022-11-03 RX ORDER — ESCITALOPRAM OXALATE 20 MG/1
20 TABLET ORAL NIGHTLY
Status: DISCONTINUED | OUTPATIENT
Start: 2022-11-03 | End: 2022-11-04 | Stop reason: HOSPADM

## 2022-11-03 RX ORDER — EPHEDRINE SULFATE 50 MG/ML
5 INJECTION, SOLUTION INTRAVENOUS ONCE AS NEEDED
Status: DISCONTINUED | OUTPATIENT
Start: 2022-11-03 | End: 2022-11-03

## 2022-11-03 RX ORDER — LIDOCAINE HYDROCHLORIDE 10 MG/ML
0.5 INJECTION, SOLUTION EPIDURAL; INFILTRATION; INTRACAUDAL; PERINEURAL ONCE AS NEEDED
Status: COMPLETED | OUTPATIENT
Start: 2022-11-03 | End: 2022-11-03

## 2022-11-03 RX ORDER — SODIUM CHLORIDE, SODIUM LACTATE, POTASSIUM CHLORIDE, CALCIUM CHLORIDE 600; 310; 30; 20 MG/100ML; MG/100ML; MG/100ML; MG/100ML
100 INJECTION, SOLUTION INTRAVENOUS CONTINUOUS
Status: DISCONTINUED | OUTPATIENT
Start: 2022-11-03 | End: 2022-11-04 | Stop reason: HOSPADM

## 2022-11-03 RX ORDER — ONDANSETRON 2 MG/ML
4 INJECTION INTRAMUSCULAR; INTRAVENOUS EVERY 6 HOURS PRN
Status: DISCONTINUED | OUTPATIENT
Start: 2022-11-03 | End: 2022-11-04 | Stop reason: HOSPADM

## 2022-11-03 RX ORDER — SODIUM CHLORIDE 0.9 % (FLUSH) 0.9 %
10 SYRINGE (ML) INJECTION EVERY 12 HOURS SCHEDULED
Status: DISCONTINUED | OUTPATIENT
Start: 2022-11-03 | End: 2022-11-03 | Stop reason: HOSPADM

## 2022-11-03 RX ORDER — ACETAMINOPHEN 650 MG/1
650 SUPPOSITORY RECTAL ONCE AS NEEDED
Status: DISCONTINUED | OUTPATIENT
Start: 2022-11-03 | End: 2022-11-03

## 2022-11-03 RX ORDER — EPHEDRINE SULFATE 50 MG/ML
INJECTION INTRAVENOUS AS NEEDED
Status: DISCONTINUED | OUTPATIENT
Start: 2022-11-03 | End: 2022-11-03 | Stop reason: SURG

## 2022-11-03 RX ORDER — VANCOMYCIN HYDROCHLORIDE 1 G/20ML
INJECTION, POWDER, LYOPHILIZED, FOR SOLUTION INTRAVENOUS AS NEEDED
Status: DISCONTINUED | OUTPATIENT
Start: 2022-11-03 | End: 2022-11-03 | Stop reason: HOSPADM

## 2022-11-03 RX ORDER — PROPOFOL 10 MG/ML
VIAL (ML) INTRAVENOUS AS NEEDED
Status: DISCONTINUED | OUTPATIENT
Start: 2022-11-03 | End: 2022-11-03 | Stop reason: SURG

## 2022-11-03 RX ADMIN — FENTANYL CITRATE 50 MCG: 50 INJECTION, SOLUTION INTRAMUSCULAR; INTRAVENOUS at 12:56

## 2022-11-03 RX ADMIN — ATORVASTATIN CALCIUM 80 MG: 40 TABLET, FILM COATED ORAL at 20:20

## 2022-11-03 RX ADMIN — CEFAZOLIN 2 G: 10 INJECTION, POWDER, FOR SOLUTION INTRAVENOUS at 17:00

## 2022-11-03 RX ADMIN — PROPOFOL 150 MG: 10 INJECTION, EMULSION INTRAVENOUS at 10:06

## 2022-11-03 RX ADMIN — FENTANYL CITRATE 50 MCG: 50 INJECTION, SOLUTION INTRAMUSCULAR; INTRAVENOUS at 12:21

## 2022-11-03 RX ADMIN — OXYCODONE 10 MG: 5 TABLET ORAL at 15:03

## 2022-11-03 RX ADMIN — ACETAMINOPHEN 1000 MG: 500 TABLET ORAL at 22:17

## 2022-11-03 RX ADMIN — FENTANYL CITRATE 50 MCG: 50 INJECTION, SOLUTION INTRAMUSCULAR; INTRAVENOUS at 10:06

## 2022-11-03 RX ADMIN — LIDOCAINE HYDROCHLORIDE 40 MG: 10 INJECTION, SOLUTION EPIDURAL; INFILTRATION; INTRACAUDAL; PERINEURAL at 10:06

## 2022-11-03 RX ADMIN — FAMOTIDINE 20 MG: 20 TABLET ORAL at 08:13

## 2022-11-03 RX ADMIN — ONDANSETRON 4 MG: 2 INJECTION INTRAMUSCULAR; INTRAVENOUS at 15:11

## 2022-11-03 RX ADMIN — EPHEDRINE SULFATE 15 MG: 50 INJECTION INTRAVENOUS at 10:20

## 2022-11-03 RX ADMIN — ONDANSETRON 4 MG: 2 INJECTION INTRAMUSCULAR; INTRAVENOUS at 22:08

## 2022-11-03 RX ADMIN — ONDANSETRON 4 MG: 2 INJECTION INTRAMUSCULAR; INTRAVENOUS at 12:17

## 2022-11-03 RX ADMIN — ROCURONIUM BROMIDE 50 MG: 10 INJECTION INTRAVENOUS at 10:06

## 2022-11-03 RX ADMIN — SUGAMMADEX 200 MG: 100 INJECTION, SOLUTION INTRAVENOUS at 11:28

## 2022-11-03 RX ADMIN — TRANEXAMIC ACID 1000 MG: 10 INJECTION, SOLUTION INTRAVENOUS at 11:12

## 2022-11-03 RX ADMIN — LIDOCAINE HYDROCHLORIDE 0.5 ML: 10 INJECTION, SOLUTION EPIDURAL; INFILTRATION; INTRACAUDAL; PERINEURAL at 08:11

## 2022-11-03 RX ADMIN — SODIUM CHLORIDE, POTASSIUM CHLORIDE, SODIUM LACTATE AND CALCIUM CHLORIDE 100 ML/HR: 600; 310; 30; 20 INJECTION, SOLUTION INTRAVENOUS at 15:00

## 2022-11-03 RX ADMIN — ONDANSETRON 4 MG: 2 INJECTION INTRAMUSCULAR; INTRAVENOUS at 11:39

## 2022-11-03 RX ADMIN — ESCITALOPRAM OXALATE 20 MG: 20 TABLET ORAL at 20:20

## 2022-11-03 RX ADMIN — DEXAMETHASONE SODIUM PHOSPHATE 8 MG: 4 INJECTION, SOLUTION INTRAMUSCULAR; INTRAVENOUS at 10:32

## 2022-11-03 RX ADMIN — FENTANYL CITRATE 50 MCG: 50 INJECTION, SOLUTION INTRAMUSCULAR; INTRAVENOUS at 11:32

## 2022-11-03 RX ADMIN — Medication 1000 MG: at 08:13

## 2022-11-03 RX ADMIN — PREGABALIN 75 MG: 75 CAPSULE ORAL at 08:13

## 2022-11-03 RX ADMIN — EPHEDRINE SULFATE 15 MG: 50 INJECTION INTRAVENOUS at 11:25

## 2022-11-03 RX ADMIN — METOPROLOL SUCCINATE 25 MG: 25 TABLET, FILM COATED, EXTENDED RELEASE ORAL at 16:57

## 2022-11-03 RX ADMIN — MONTELUKAST 10 MG: 10 TABLET, FILM COATED ORAL at 20:20

## 2022-11-03 RX ADMIN — SODIUM CHLORIDE, POTASSIUM CHLORIDE, SODIUM LACTATE AND CALCIUM CHLORIDE 9 ML/HR: 600; 310; 30; 20 INJECTION, SOLUTION INTRAVENOUS at 08:11

## 2022-11-03 RX ADMIN — ACETAMINOPHEN 1000 MG: 500 TABLET ORAL at 15:00

## 2022-11-03 RX ADMIN — OXYCODONE 10 MG: 5 TABLET ORAL at 20:20

## 2022-11-03 RX ADMIN — EPHEDRINE SULFATE 5 MG: 50 INJECTION INTRAVENOUS at 10:17

## 2022-11-03 RX ADMIN — EPHEDRINE SULFATE 10 MG: 50 INJECTION INTRAVENOUS at 10:25

## 2022-11-03 RX ADMIN — ACETAMINOPHEN 1000 MG: 500 TABLET ORAL at 08:13

## 2022-11-03 RX ADMIN — TRANEXAMIC ACID 1000 MG: 10 INJECTION, SOLUTION INTRAVENOUS at 10:23

## 2022-11-03 RX ADMIN — CETIRIZINE HYDROCHLORIDE 10 MG: 10 TABLET, FILM COATED ORAL at 20:20

## 2022-11-03 RX ADMIN — LISINOPRIL 10 MG: 10 TABLET ORAL at 16:57

## 2022-11-03 RX ADMIN — MELOXICAM 15 MG: 15 TABLET ORAL at 08:13

## 2022-11-03 RX ADMIN — CEFAZOLIN 2 G: 10 INJECTION, POWDER, FOR SOLUTION INTRAVENOUS at 10:12

## 2022-11-03 NOTE — PLAN OF CARE
Goal Outcome Evaluation:  Plan of Care Reviewed With: patient        Progress: no change  Outcome Evaluation: PT eval complete. Pt performed bed mobility with Min A. Pt performed STS and amb 700' with FWW and CGAx2. No knee buckling or LOB noted. Activity limited by fatigue. HEP and precautions reviewed with pt. Recommend d/c home with assist and OPPT. ADLs assessed. No need for OT consult at this time.

## 2022-11-03 NOTE — ANESTHESIA POSTPROCEDURE EVALUATION
Patient: Osvaldo Vences    Procedure Summary     Date: 11/03/22 Room / Location:  [facility]    Anesthesia Start: 0959 Anesthesia Stop: 1203    Procedure: ANTERIOR TOTAL HIP ARTHROPLASTY - LEFT (Left: Hip) Diagnosis:     Surgeons: Colin Cevallos MD Provider: Cr Linares MD    Anesthesia Type: general ASA Status: 3          Anesthesia Type: general    Vitals  No vitals data found for the desired time range.          Post Anesthesia Care and Evaluation    Patient location during evaluation: PACU  Patient participation: complete - patient participated  Level of consciousness: sleepy but conscious  Pain management: adequate    Airway patency: patent  Anesthetic complications: No anesthetic complications  PONV Status: none  Cardiovascular status: hemodynamically stable and acceptable  Respiratory status: nonlabored ventilation, acceptable, nasal cannula and oral airway  Hydration status: acceptable

## 2022-11-03 NOTE — H&P
Patient Name: Osvaldo Vences  MRN: [id number]  : 1960  DOS: 11/3/2022    Attending: Colin Cevallos MD    Primary Care Provider: Marty Mason MD      Chief complaint: Left hip pain    Subjective   Patient is a pleasant 62 y.o. male presented for scheduled surgery by Dr. Cevallos.    Per his note (62 y.o. male who was admitted to [facility] on a progressive management of nonoperative treatment of their hip osteoarthritis. Unfortunately patient progressed with significant pain and difficulty with ambulation and daily function.  The patient was indicated for a total hip arthroplasty. Likely risk benefits of the procedure including but not limited to infection, DVT, pulmonary embolism, leg length discrepancy, recurrent dislocation, possibility of injury to nerves and vessels, and periprosthetic fractures have been discussed with the patient and her daughter in detail. Despite the risks involved the patient elected to proceed with an informed consent was obtained.).    Patient is known to me from prior hospitalization in 2020 when he had right total hip arthroplasty, he did well postop, was discharged home postop day 1 and did well with his rehab afterwards.    Today he underwent left total hip arthroplasty under spinal anesthesia, tolerated surgery well, is being admitted for further management.    Seen in PACU, doing well, no complains of nausea, vomiting, or shortness of breath.  Good pain control.    Patient with no history of DVT or PE.  He has history of coronary artery disease, he is on regimen of beta-blocker, Brilinta, and statin.      Allergies:  No Known Allergies    Meds:  Medications Prior to Admission   Medication Sig Dispense Refill Last Dose   • atorvastatin (LIPITOR) 40 MG tablet Take 1 tablet by mouth Every Night.   2022 at    • atorvastatin (LIPITOR) 80 MG tablet Take 80 mg by mouth Every Night.   2022 at    • cetirizine (zyrTEC) 10 MG tablet  Take 1 tablet by mouth Daily.   11/2/2022   • escitalopram (LEXAPRO) 20 MG tablet Take 20 mg by mouth Every Night.   11/2/2022   • fluticasone (FLONASE) 50 MCG/ACT nasal spray 2 sprays into the nostril(s) as directed by provider.   11/3/2022   • HYDROcodone-acetaminophen (NORCO) 7.5-325 MG per tablet Take 1 tablet by mouth Every 6 (Six) Hours As Needed for Moderate Pain .   11/2/2022   • lisinopril (PRINIVIL,ZESTRIL) 10 MG tablet Take 1 tablet by mouth Every Evening.   11/2/2022   • meloxicam (MOBIC) 15 MG tablet Take 1 tablet by mouth Daily.   11/2/2022   • montelukast (SINGULAIR) 10 MG tablet Take 1 tablet by mouth Every Night.   11/2/2022   • potassium chloride 10 MEQ CR tablet Take 1 tablet by mouth.   11/2/2022   • valsartan-hydrochlorothiazide (DIOVAN-HCT) 160-12.5 MG per tablet Take 1 tablet by mouth Every Night.   11/2/2022   • Brilinta 90 MG tablet tablet Take 1 tablet by mouth 2 (Two) Times a Day. Patient states he is to stop Brilinta 1 week prior to surgery   10/28/2022   • metoprolol succinate XL (TOPROL-XL) 25 MG 24 hr tablet Take 1 tablet by mouth Every Evening.   11/1/2022         Past Medical History:   Diagnosis Date   • Arthritis    • Coronary artery disease    • History of heart attack     hx MI x 3   • History of hip replacement, total, right 2020   • History of Horseshoe kidney     surgically repaired in 1977   • History of kidney stones     Multiple   • Hypertension    • Wears glasses      Past Surgical History:   Procedure Laterality Date   • CARDIAC CATHETERIZATION     • COLONOSCOPY      over ten years ago since last colonoscopy ; info given    • CORONARY ANGIOPLASTY WITH STENT PLACEMENT     • KIDNEY SURGERY      horseshoe kidney correction surgery    • TOTAL HIP ARTHROPLASTY Right 1/9/2020    Procedure: TOTAL HIP ARTHROPLASTY ANTERIOR DIRECT RIGHT;  Surgeon: Colin Cevallos MD;  Location: [facility];  Service: Orthopedics     History reviewed. No pertinent family history.  Social History      Tobacco Use   • Smoking status: Never   • Smokeless tobacco: Never   Vaping Use   • Vaping Use: Never used   Substance Use Topics   • Alcohol use: Not Currently   • Drug use: Yes     Frequency: 7.0 times per week     Types: Marijuana     Comment: smokes occassionally (had strong odor of Marijuana in PAT visit)       Review of Systems  Pertinent items are noted in HPI, all other systems reviewed and negative    Vital Signs  /71 (BP Location: Left arm, Patient Position: Lying)   Pulse 79   Temp 99 °F (37.2 °C) (Axillary)   Resp 12   SpO2 93%     Physical Exam:    General Appearance:    Alert, cooperative, in no acute distress   Head:    Normocephalic, without obvious abnormality, atraumatic   Eyes:            Lids and lashes normal, conjunctivae and sclerae normal, no   icterus, no pallor, corneas clear    Ears:    Ears appear intact with no abnormalities noted   Throat:   No oral lesions, no thrush, oral mucosa moist   Neck:   No adenopathy, supple, trachea midline, no thyromegaly         Lungs:     Clear to auscultation,respirations regular, even and   unlabored. No wheezes or rales.    Heart:    Regular rhythm and normal rate, normal S1 and S2, no murmur, no gallop   Abdomen:     Normal bowel sounds, no masses, no organomegaly, soft        non-tender, non-distended, no guarding, no rebound                 tenderness   Genitalia:    Deferred   Extremities:  Left LE, CDI dressing Optifoam  over left hip.  No clubbing, cyanosis, or edema.   Pulses:   Pulses palpable and equal bilaterally   Skin:   No bleeding, bruising or rash   Neurologic:   Cranial nerves 2 - 12 grossly intact, intact flexion and dorsiflexion bilateral feet      I reviewed the patient's new clinical results.             Invalid input(s): NEUTOPHILPCT,  EOSPCT  Results from last 7 days   Lab Units 11/03/22  0818   POTASSIUM mmol/L 4.0     Lab Results   Component Value Date    HGBA1C 6.10 (H) 10/21/2022      Latest Reference Range  & Units 10/21/22 09:46   Glucose 65 - 99 mg/dL 168 (H)   Sodium 136 - 145 mmol/L 143   Potassium 3.5 - 5.2 mmol/L 3.7   CO2 22.0 - 29.0 mmol/L 26.0   Chloride 98 - 107 mmol/L 105   Anion Gap 5.0 - 15.0 mmol/L 12.0   Creatinine 0.76 - 1.27 mg/dL 0.97   BUN 8 - 23 mg/dL 16   BUN/Creatinine Ratio 7.0 - 25.0  16.5   Calcium 8.6 - 10.5 mg/dL 9.3   eGFR >60.0 mL/min/1.73 87.2   Alkaline Phosphatase 39 - 117 U/L 79   Total Protein 6.0 - 8.5 g/dL 6.9   ALT (SGPT) 1 - 41 U/L 26   AST (SGOT) 1 - 40 U/L 17   Total Bilirubin 0.0 - 1.2 mg/dL 0.3   Albumin 3.50 - 5.20 g/dL 4.60   Globulin gm/dL 2.3   A/G Ratio g/dL 2.0   Fructosamine 0 - 285 umol/L 205   (H): Data is abnormally high     Latest Reference Range & Units 10/21/22 09:46   WBC 3.40 - 10.80 10*3/mm3 5.41   RBC 4.14 - 5.80 10*6/mm3 4.61   Hemoglobin 13.0 - 17.7 g/dL 13.4   Hematocrit 37.5 - 51.0 % 40.9   RDW 12.3 - 15.4 % 13.6   MCV 79.0 - 97.0 fL 88.7   MCH 26.6 - 33.0 pg 29.1   MCHC 31.5 - 35.7 g/dL 32.8   MPV 6.0 - 12.0 fL 10.4   Platelets 140 - 450 10*3/mm3 197   RDW-SD 37.0 - 54.0 fl 43.8       Assessment and Plan:       Status post total hip replacement, left    Hyperlipidemia    Hypertension    Coronary artery disease    Arthritis of left hip  Obesity    Plan:    1. PT/OT,  Weight bearing as tolerated left LE.  2. Pain control-prns  3. IS-encourage  4. DVT proph- Mechanicals and Brilinta starting postop day 1  5. Bowel regimen  6. Resume home medications as appropriate  7. Monitor post-op labs  8. DC planning for home    - Hypertension:  Resume home medications as appropriate, formulary substitution when indicated.  Holding parameters.  Prn medications for elevated blood pressure.    -Dyslipidemia:  Resume home regimen statin ( formulary substitution when appropriate).    -CAD:  Resume beta-blocker, statin, and resume Brilinta on postop day 1.    Discussed with patient postop management plan, expressed understanding and agreement.      Dragon disclaimer:  Part of  this encounter note is an electronic transcription/translation of spoken language to printed text. The electronic translation of spoken language may permit erroneous, or at times, nonsensical words or phrases to be inadvertently transcribed; Although I have reviewed the note for such errors, some may still exist.    uRthy Souza MD  11/03/22  12:42 EDT

## 2022-11-03 NOTE — OP NOTE
TOTAL HIP ARTHROPLASTY ANTERIOR  Procedure Report    Patient Name:  Osvaldo Vences  YOB: 1960    Date of Surgery:  11/3/2022     Indications:    62 y.o. male who was admitted to [facility] on a progressive management of nonoperative treatment of their hip osteoarthritis. Unfortunately patient progressed with significant pain and difficulty with ambulation and daily function.  The patient was indicated for a total hip arthroplasty. Likely risk benefits of the procedure including but not limited to infection, DVT, pulmonary embolism, leg length discrepancy, recurrent dislocation, possibility of injury to nerves and vessels, and periprosthetic fractures have been discussed with the patient and her daughter in detail. Despite the risks involved the patient elected to proceed with an informed consent was obtained.     Patient Identification:  Patient was seen in pre-op, consent was reviewed, operative procedure was identified, surgical site and thigh marked.    Complexity Modifier:   Due to the patients BMI 35, the surgery required additional surgical dissection, assistance with retraction, and increased surgical exposure in order to perform the total joint replacement. This required additional assistance in the operating room, unique instrumentation and techniques, increased time, increased risk, which all made for a more complex and difficult joint replacement procedure. This was due to the increased body mass index and obesity in addition to the severity of the arthritis. Therefore, a Modifier 22 is being utilized.      Pre-op Diagnosis:   Left hip osteoarthritis       Post-Op Diagnosis Codes:  Same    Procedure/CPT® Codes:      Procedure(s):  ANTERIOR TOTAL HIP ARTHROPLASTY - LEFT    Staff:  Surgeon(s):  Colin Cevallos MD    Anesthesia: General    Estimated Blood Loss: 250    Implants:    Implant Name Type Inv. Item Serial No.  Lot No. LRB No. Used Action   DEV  CONTRL TISS STRATAFIX SYMM PDS PLUS DHEERAJ CT-1 45CM - [id number] Implant DEV CONTRL TISS STRATAFIX SYMM PDS PLUS DHEERAJ CT-1 45CM  [employer]  Left 1 Implanted   SUT NONABS MAXBRAID/PE NMBR2 HC5 38IN T [id number] - [id number] Implant SUT NONABS MAXBRAID/PE NMBR2 HC5 38IN T [id number]  [employer]  Left 2 Implanted   LINER ACET G7 NTRL E1 CORBIN 36MM - [id number] Implant LINER ACET G7 NTRL E1 CORBIN 36MM  [employer] [id number] Left 1 Implanted   SCRW ACET RAMIREZ TRILOGY S/TAP 6.5X25 - [id number] Implant SCRW ACET RAMIREZ TRILOGY S/TAP 6.5X25  [employer] [id number] Left 1 Implanted   SHLL ACET OSSEOTI G7 3H CORBIN 52MM - [id number] Implant SHLL ACET OSSEOTI G7 3H CORBIN 52MM  [employer] [id number] Left 1 Implanted   HD MOD FEM/HIP G7 BIOLOX/DELTA TYP1 CERAM 36MM MIN3 - [id number] Implant HD MOD FEM/HIP G7 BIOLOX/DELTA TYP1 CERAM 36MM MIN3  [employer] [id number] Left 1 Implanted   STEM FEM/HIP TAPERLOC COMPL DIST/REDUC PPS OFFST/HI SZ10 - [id number] Implant STEM FEM/HIP TAPERLOC COMPL DIST/REDUC PPS OFFST/HI SZ10  [employer] [id number] Left 1 Implanted       Specimen:          None      Findings: see dictation    Complications: none    Description of Procedure: The patient was transferred to [facility] operating room, preoperative antibiotics in form of Kefzol 2gm Given IV Prior to skin incision as well as 1 g of tranexamic acid. Patient received general anesthesia and was transferred to the [place] table. All bony prominences were padded adequately. The operative hip was then prepped and draped in the usual sterile fashion. Multiple timeouts were done identifying the correct patient, surgical site, and planned procedure.    A skin incision was made overlying the anterior lateral aspect of the hip for about 10 cm just below the lateral to the anterior superior iliac spine. Skin and subcutaneous tissue and fascia were incised in line with the skin incision overlying the tensor fascia lala muscle. The tensor muscle  was then retracted laterally and the interval between sartorius and rectus femoris medially and the tensor fascia muscle was developed. The lateral femoral circumflex vessels were identified and they were ligated without difficulty. The deep fascia was incised and the capsule of the hip joint was identified. We then placed a soft tissue protecting device deep to the rectus and the tensor. Retractors were then placed and extracapsular capsule was then Incised in a T-shaped manner and the anterior posterior capsules were then tied with MaxBraid suture . Following this, retractors were then placed intracapsularly. We did identify the obvious signs of severe osteoarthritis upon incising the capsule. We then made appropriate releases done on the inferior medial neck and along the saddle of the femoral neck. The remaining femoral neck was identified and osteotomy was done according to the preoperative templating with an oscillating saw. The femoral head and cut neck were extracted using a corkscrew without difficulty. The femoral head did have major signs of articular cartilage loss and superior wear.    The acetabular cavity was exposed by placing retractors and taking care to protect the anterior vascular structures. The labrum was excised. The pulvinar was excised from the cotyloid fossa. The acetabular cavity was then progressively reamed maintaining the correct inclination and version under image intensifier control. Adequate medialization was obtained. Adequate fit was found to be obtained with the reamer size of 51. The Biomet G7 OsseoTi cup size 52 was then impacted into position. This was found to seat satisfactorily with adequate inclination and anteversion. It was stable but we did insert one 6.5/25 mm lag screw. This was checked under fluoroscopy and found to be in good position. Following this, the cup was cleaned and then a 36 neutralE-Poly impacted. Following this, the periarticular tissues were then  infiltrated with local anesthetic.    Attention was then directed to the proximal femur. The proximal femur was delivered using a trochanteric hook placed just distal to the vastus tubercle and proximal to the gluteus brock tendon. The leg was then externally rotated and gross traction released and hyperextension and adduction were done using the [place] table. Mechanical lift on the table was utilized to support the femur. Appropriate capsular releases were made to expose the medial slope of the greater trochanter and proximal femur was delivered. The femoral canal was then entered by removing lateral femoral neck with a box chisel by serial broaching. Adequate fit was found to be obtained with the size 10 broach. We then trialed a -3 neck was reduced. Fluoroscopic imaging was used to assess leg length and offset was found to be symmetric. The trials were then removed and a #10 Taperloc complete press-fit stem HO was implanted in appropriate anteversion. It sat very similar to our broach trial. We chose the -3 neck ceramic. This was then reduced and again fluoroscopic remaining images both lateral and AP of the femur showed the stem to be in good position. AP pelvis showed symmetric leg length and offset. The hip was then taken through a range of motion and found to be stable. There were no acute fractures and the wound was then thoroughly irrigated with saline. We did use more of the injection cocktail. Betadine irrigation was used followed by 3L of saline irrigation. Soft tissue hemostasis was secured and topical TXA was used. Sponge, needle, and instrument counts were correct. MaxBraid sutures directly anterior and posterior capsular flaps were tied to each other and then closed the fascial layer with a running #2 STRATAFIX followed by 2-0 Vicryl and then Monocryl for the skin and Dermabond with Prineo mesh over the top. Once the Dermabond had dried, sterile dressing was placed over the top. The patient was then  transferred to the recovery room in stable condition. Patient tolerated the procedure well. There were no medications. The patient had adequate distal pulses and good cap refill.    The patient will be mobilized by physical therapy. The patient will receive antibiotic prophylaxis postoperatively for 2 more doses given the first 24 hours. The patient was started on DVT prophylaxis with Brilinta daily on starting postoperative day #1.    I discussed the satisfactory performance of the procedure with the patient's family and discussed the postoperative management.      Assistant Participation:  Surgeon(s):  Colin Cevallos MD    Assistant: Neena Livingston PA-C assisted with proper preoperative positioning, preoperative templating, determingin availability of proper implants, prepping and draping of patient, manipulation placement of instruments, protection of ligaments and vital soft tissue structures, assistance in maintaining hemostasis and assistance with closure of the wound. Their skills and knowledge of the steps of operation and the desired outcome of each surgical step was crucial, allowing for efficient choreography of surgical procedure, and closure of the wound which lead to reduced surgical time, less blood loss, and less risk of complications for the patient.       Colin Cevallos MD     Date: 11/3/2022  Time: 11:44 EDT

## 2022-11-03 NOTE — ANESTHESIA PREPROCEDURE EVALUATION
Anesthesia Evaluation     Patient summary reviewed and Nursing notes reviewed                Airway   Mallampati: II  TM distance: >3 FB  Neck ROM: full  No difficulty expected  Dental - normal exam     Pulmonary - normal exam   (+) a smoker (THC),   Cardiovascular - normal exam    (+) hypertension, CAD, cardiac stents (x3, last stent 8/3/22; brilinta tx - last dose = 5.5 days ago) hyperlipidemia,     ROS comment:   Acceptable cardiac risk per cardiology    Neuro/Psych- negative ROS  GI/Hepatic/Renal/Endo      Musculoskeletal (-) negative ROS    Abdominal  - normal exam    Bowel sounds: normal.   Substance History - negative use     OB/GYN negative ob/gyn ROS         Other                      Anesthesia Plan    ASA 3     general     (No spinal d/t Brilinta)  intravenous induction     Anesthetic plan, risks, benefits, and alternatives have been provided, discussed and informed consent has been obtained with: patient.    Plan discussed with CRNA.        CODE STATUS:

## 2022-11-03 NOTE — H&P
Pre-Op H&P  Osvaldo Vences  [id number]  1960      Chief complaint: Left hip pain      Subjective:  Patient is a 62 y.o.male presents for scheduled surgery by Dr. Cevallos. He anticipates a ANTERIOR TOTAL HIP ARTHROPLASTY - LEFT today. His hip has been painful for many years. He denies use of assistive device for ambulation or recent falls. Conservative treatments failed to provide lasting benefits. He had right hip replacement Jan 2020.      Review of Systems:  Constitutional-- No fever, chills or sweats. No fatigue.  CV-- No chest pain, palpitation or syncope. +HTN, HLD, CAD +cardiac clearance  Resp-- No SOB, cough, hemoptysis  Skin--No rashes or lesions      Allergies: No Known Allergies      Home Meds:  Medications Prior to Admission   Medication Sig Dispense Refill Last Dose   • atorvastatin (LIPITOR) 40 MG tablet Take 1 tablet by mouth Every Night.   11/2/2022 at 2000   • atorvastatin (LIPITOR) 80 MG tablet Take 80 mg by mouth Every Night.   11/2/2022 at 2000   • Brilinta 90 MG tablet tablet Take 1 tablet by mouth 2 (Two) Times a Day. Patient states he is to stop Brilinta 1 week prior to surgery      • cetirizine (zyrTEC) 10 MG tablet Take 1 tablet by mouth Daily.      • escitalopram (LEXAPRO) 20 MG tablet Take 20 mg by mouth Every Night.      • fluticasone (FLONASE) 50 MCG/ACT nasal spray 2 sprays into the nostril(s) as directed by provider.      • HYDROcodone-acetaminophen (NORCO) 7.5-325 MG per tablet Take 1 tablet by mouth Every 6 (Six) Hours As Needed for Moderate Pain .      • lisinopril (PRINIVIL,ZESTRIL) 10 MG tablet Take 1 tablet by mouth Every Evening.      • meloxicam (MOBIC) 15 MG tablet Take 1 tablet by mouth Daily.      • metoprolol succinate XL (TOPROL-XL) 25 MG 24 hr tablet Take 1 tablet by mouth Every Evening.      • metoprolol tartrate (LOPRESSOR) 50 MG tablet Take 0.5 tablets by mouth.      • montelukast (SINGULAIR) 10 MG tablet Take 1 tablet by mouth Every Night.      • potassium  "chloride 10 MEQ CR tablet Take 1 tablet by mouth.      • Ticagrelor (BRILINTA PO) Take 60 mg by mouth 2 (Two) Times a Day. Patient's last dose was 1-2-20 per Dr. Cevallos's orders-left message with Dr. Cevallos and Dr. Lopez to \"ok\" to stop.      • valsartan-hydrochlorothiazide (DIOVAN-HCT) 160-12.5 MG per tablet Take 1 tablet by mouth Every Night.            PMH:   Past Medical History:   Diagnosis Date   • Arthritis    • Coronary artery disease    • History of heart attack     hx MI x 3   • History of hip replacement, total, right 2020   • History of Horseshoe kidney     surgically repaired in 1977   • History of kidney stones     Multiple   • Hypertension    • Wears glasses      PSH:    Past Surgical History:   Procedure Laterality Date   • CARDIAC CATHETERIZATION     • COLONOSCOPY      over ten years ago since last colonoscopy ; info given    • CORONARY ANGIOPLASTY WITH STENT PLACEMENT     • KIDNEY SURGERY      horseshoe kidney correction surgery    • TOTAL HIP ARTHROPLASTY Right 1/9/2020    Procedure: TOTAL HIP ARTHROPLASTY ANTERIOR DIRECT RIGHT;  Surgeon: Colin Cevallos MD;  Location: [facility];  Service: Orthopedics       Immunization History:  Influenza: No  Pneumococcal: UTD  Tetanus: UTD  Covid x3: 2021    Social History:   Tobacco:   Social History     Tobacco Use   Smoking Status Never   Smokeless Tobacco Never      Alcohol:     Social History     Substance and Sexual Activity   Alcohol Use Not Currently         Physical Exam:/73 (BP Location: Right arm, Patient Position: Lying)   Pulse 57   Temp 96.8 °F (36 °C) (Temporal)   Resp 18   SpO2 96%       General Appearance:    Alert, cooperative, no distress, appears stated age   Head:    Normocephalic, without obvious abnormality, atraumatic   Lungs:     Clear to auscultation bilaterally, respirations unlabored    Heart:   Regular rate and rhythm, S1 and S2 normal    Abdomen:    Soft without tenderness   Extremities:   Extremities normal, atraumatic, " no cyanosis or edema   Skin:   Skin color, texture, turgor normal, no rashes or lesions   Neurologic:   Grossly intact     Results Review:     LABS:  Lab Results   Component Value Date    WBC 5.41 10/21/2022    HGB 13.4 10/21/2022    HCT 40.9 10/21/2022    MCV 88.7 10/21/2022     10/21/2022    NEUTROABS 3.50 10/21/2022    GLUCOSE 168 (H) 10/21/2022    BUN 16 10/21/2022    CREATININE 0.97 10/21/2022    EGFRIFNONA 90 01/10/2020     10/21/2022    K 3.7 10/21/2022     10/21/2022    CO2 26.0 10/21/2022    CALCIUM 9.3 10/21/2022    ALBUMIN 4.60 10/21/2022    AST 17 10/21/2022    ALT 26 10/21/2022    BILITOT 0.3 10/21/2022       RADIOLOGY:  Imaging Results (Last 72 Hours)     ** No results found for the last 72 hours. **          I reviewed the patient's new clinical results.    Cancer Staging (if applicable)  Cancer Patient: __ yes __no __unknown; If yes, clinical stage T:__ N:__M:__, stage group or __N/A      Impression: Left hip pain      Plan: ANTERIOR TOTAL HIP ARTHROPLASTY - LEFT      Rosamaria Burroughs, APRN   11/3/2022   08:20 EDT

## 2022-11-03 NOTE — DISCHARGE INSTRUCTIONS
[facility] Primary Hip and Knee:  You have a sterile dressing in place. Only exchange the dressing if it becomes saturated (fluid draining out the sides of dressing) and notify the office. The dressing is water resistant. During the first 7 days after surgery, it is ok to shower. DO NOT scrub on or around the dressing. Do not submerge the dressing.  After 7 days, you can remove your dressing. Your sutures are all underneath your skin. There is a layer of glue over the incision. DO NOT pick at this or try to peal it off. If the edges do peel up it is ok to trim them as needed. It is OK to shower with the incision exposed. DO NOT scrub on or around the incision. DO NOT submerge the incision in pools, baths, or hot tubs for 1 month after surgery.  No creams or ointments to the incision for 1 month after surgery. After 1 month, it is recommended to massage the scar with vitamin E cream to help decrease scar formation.  Check incision every day and notify surgeon immediately if any of the following signs or symptoms are noted:  Increase in redness  Increase in swelling around the incision and of the entire extremity  Increase in pain  Drainage oozing from the incision  Pulling apart of the edges of the incision  Increase in overall body temperature (greater than 100.5 degrees)    Anticoagulants: You will be discharged on an anticoagulant. This is a prophylactic medication that helps prevent blood clots during your post-operative period. Most patients will be on Aspirin 81 mg Enteric coated every 12 hours orally for 30 days. Some patients, due to increased risk factors, will need to be on a stronger anticoagulant. Dr. Cevallos will discuss this need with you.   While taking the anticoagulant, you should avoid taking any additional aspirin, and limit ibuprofen (Advil or Motrin), Aleve (Naprosyn) or other non-steroidal anti-inflammatory medications.   Notify your surgeon immediately if any tania bleeding is noted in  the urine, stool, vomit, or from the nose or the incision. Blood in the stool will often appear as black rather than red. Blood in urine may appear as pink. Blood in vomit may appear as brown/black like coffee grounds.  You will need to apply pressure for longer periods of time to any cuts or abrasions to stop bleeding  Avoid alcohol while taking anticoagulants  Sequential Compression Device: You maybe be discharged home with a compression device that helps promote blood flow and prevent clots in your legs. Wear these at all times for the first two weeks.   Mobilization: The best way to avoid a blood clot is to get up and walk. 10 times a day get up and walk for 5 minutes for the first two weeks. Walking for longer periods of time will increase pain and swelling, making therapy more difficult. If taking any long travel (car or plane) in the first 1-2 months, be sure to get up and walk at least every one hour.     Stool Softeners: You will be at greater risk of constipation after surgery because of being less mobile and taking the pain medications.   Take stool softeners as instructed by your surgeon while on pain medications. Use over the counter Colace 100 mg 1-2 capsules twice daily.   If stools become too loose or too frequent, decreases the dosage or stop the stool softener.  If constipation occurs despite use of stool softeners, you are to continue the stool softeners and add a laxative (Milk of Magnesia 1 ounce daily as needed).  Dulcolax oral tabs or suppository or a fleets enema can also be utilized for constipation and can be obtained over the counter.   If above interventions are unsuccessful in inducing bowel movements, please contact your family physician's office/surgeon's office.  Drink plenty of fluids and eat fruits and vegetables during your recovery time    Pain Medications utilized after surgery are narcotics and the law requires that the following information be given to all patients that are  prescribed narcotics:  CLASSIFICATION: Pain medications are called Opioids and are narcotics  LEGALITIES: It is illegal to share narcotics with others and to drive within 24 hours of taking narcotics  POTENTIAL SIDE EFFECTS: Potential side effects of opioids include: nausea, vomiting, itching, dizziness, drowsiness, dry mouth, constipation, and difficulty urinating.  POTENTIAL ADVERSE EFFECTS:   Opioid tolerance can develop with use of pain medications and this simply means that it requires more and more of the medication to control pain; however, this is seen more in patients that use Opioids for longer periods of time.  Opioid dependence can develop with use of Opioids and this simply means that to stop the medication can cause withdrawal symptoms; however, this is seen with patients that use Opioids for longer periods of time.  Opioid addiction can develop with use of Opioids and the incidence of this is very unlikely in patients who take the medications as ordered and stop the medications as instructed.  Opioid overdose can be dangerous, but is unlikely when the medication is taken as ordered and stopped when ordered. It is important not to mix opioids with alcohol or with any type of sedative, such as Benadryl, as this can lead to over sedation and respiratory difficulty.  DOSAGE:   Pain medications may be needed consistently for the first week to decrease pain and promote adequate pain relief and participation in physical therapy.  After the initial surgical pain begins to resolve, you may begin to decrease the pain medication and only take it as needed. By the end of 6 weeks, you should be off of pain medications.  You can decrease your pain medication consumption by slowly spacing out the time in between the medication, and using 650mg Tylenol when the pain is not as severe. Do not exceed 3500mg of Tylenol in 24 hours.   Refills will not be given by the office during evening hours, on weekends, or after 6  weeks post-op.  To seek refills on pain medications during the initial 6 week post-operative period, you must call the office 48 hours in advance to request the refill. The office will then notify you when to  the prescription. DO NOT wait until you are out of the medication to request a refill.               S[facility] COLD THERAPY - PATIENT INSTRUCTION SHEET    Cold Compression Therapy for your comfort and rehabilitation  Your caregivers want you to be productive in your rehab and comfortable during your stay. In keeping with those goals, you will be receiving an SMI Cold Therapy Wrap to help ease post-operative pain and swelling that might keep you from getting back on track! Your SMI Cold Therapy Wrap is effective and simple-to-use, and you will be encouraged to apply it throughout your hospital stay and at home through the duration of your recovery.    When you are ready to go home  Be sure to take your SMI Cold Therapy Wrap and both sets of Gel Bags with you for continued comfort and use throughout your rehabilitation. If you don't already have them, ask your nurse or aide to retrieve your SMI Gel Bags from the patient freezer.    Home use precautions  Always follow your medical professional's application instructions upon discharge. Your SMI Cold Therapy Wrap and Gel Bags are designed to last for months following your surgery. Never heat the Gel Bags unless specified by your healthcare provider. Supervision is advised when using this product on children or geriatric patients. To avoid danger of suffocation, please keep the outer plastic packaging away from children & pets.    Cold Therapy Instructions  Place Gel Bags in a freezer set ¾ of the way to max temperature for at least (4) hours. For best results, lay the Gel Bags flat and [id number] in the freezer. Once frozen, slide Gel Bags into the gel pouch and secure your wrap to the affected area with the straps.  Gel wraps that have been stored in a freezer  for an extended period of time may require a (10) minute period of softening up in a room temperature environment before application.  The gel pouch acts as a protective barrier. NEVER place frozen bags directly onto skin, as this may cause frostbite injury.  The [facility] Cold Therapy Wrap is designed to be able to be worm while ambulating. The compression straps can be secured well enough so that the Wrap won't fall off while moving.  Wrap Application Videos can be viewed at [email or web address].  An additional protective barrier such as clothing, a washcloth, hand-towel or pillowcase may be used during prolonged treatment applications.  The Gel-Pouch and Wrap are both Latex-Free and the Gel Bag ingredients are non toxic.    [facility] Wrap care instructions  The [facility] Cold Therapy Wrap may be hand washed and hung to dry when needed.    [facility] re-order information  Additional [facility] body specific wraps and/or Gel Bags can be re-ordered from [email or web address] or call [email or web address] ([phone])

## 2022-11-03 NOTE — THERAPY EVALUATION
Patient Name: Osvaldo Vences  : 1960    MRN: [id number]                              Today's Date: 11/3/2022       Admit Date: 11/3/2022    Visit Dx: No diagnosis found.  Patient Active Problem List   Diagnosis   • Arthritis of right hip   • Status post total replacement of right hip   • Hyperlipidemia   • Hypertension   • Coronary artery disease   • Prediabetes   • Leukocytosis, likely reactive   • Acute blood loss anemia, mild, asymptomatic   • Acute postoperative pain   • Arthritis of left hip   • Status post total hip replacement, left     Past Medical History:   Diagnosis Date   • Arthritis    • Coronary artery disease    • History of heart attack     hx MI x 3   • History of hip replacement, total, right    • History of Horseshoe kidney     surgically repaired in    • History of kidney stones     Multiple   • Hypertension    • Wears glasses      Past Surgical History:   Procedure Laterality Date   • CARDIAC CATHETERIZATION     • COLONOSCOPY      over ten years ago since last colonoscopy ; info given    • CORONARY ANGIOPLASTY WITH STENT PLACEMENT     • KIDNEY SURGERY      horseshoe kidney correction surgery    • TOTAL HIP ARTHROPLASTY Right 2020    Procedure: TOTAL HIP ARTHROPLASTY ANTERIOR DIRECT RIGHT;  Surgeon: Colin Cevallos MD;  Location: [facility];  Service: Orthopedics      General Information     Row Name 22 1648          Physical Therapy Time and Intention    Document Type evaluation  -HP     Mode of Treatment physical therapy  -HP     Row Name 22 1648          General Information    Patient Profile Reviewed yes  -HP     Prior Level of Function min assist:;all household mobility;gait;community mobility;transfer;bed mobility;ADL's  -HP     Existing Precautions/Restrictions fall;hip, anterior  -HP     Barriers to Rehab none identified  -HP     Row Name 22 1648          Living Environment    People in Home child(nela), adult;spouse;grandchild(nela)  -HP     Row Name  11/03/22 1648          Home Main Entrance    Number of Stairs, Main Entrance one  -HP     Row Name 11/03/22 1648          Stairs Within Home, Primary    Number of Stairs, Within Home, Primary none  -HP     Row Name 11/03/22 1648          Cognition    Orientation Status (Cognition) oriented x 3  -HP     Row Name 11/03/22 1648          Safety Issues, Functional Mobility    Safety Issues Affecting Function (Mobility) insight into deficits/self-awareness;awareness of need for assistance;safety precaution awareness  -     Impairments Affecting Function (Mobility) balance;endurance/activity tolerance;pain;strength  -           User Key  (r) = Recorded By, (t) = Taken By, (c) = Cosigned By    Initials Name Provider Type     Cathy Mcelroy PT Physical Therapist               Mobility     Row Name 11/03/22 1649          Bed Mobility    Bed Mobility supine-sit  -     Supine-Sit [place] (Bed Mobility) minimum assist (75% patient effort);2 person assist  -     Assistive Device (Bed Mobility) bed rails;draw sheet;head of bed elevated  -     Comment, (Bed Mobility) Increased time and effort secondary to elevated pain. Min a for trunk management  -     Row Name 11/03/22 1649          Transfers    Comment, (Transfers) VC for hand placement  -     Row Name 11/03/22 1649          Sit-Stand Transfer    Sit-Stand [place] (Transfers) contact guard;2 person assist  -     Assistive Device (Sit-Stand Transfers) walker, front-wheeled  -     Row Name 11/03/22 1649          Gait/Stairs (Locomotion)    [place] Level (Gait) contact guard;2 person assist  -     Assistive Device (Gait) walker, front-wheeled  -HP     Ambulated day of surgery or within 4 hours of PACU discharge yes  -HP     Distance in Feet (Gait) 700  -     Deviations/Abnormal Patterns (Gait) bilateral deviations;base of support, wide;weight shifting decreased;stride length decreased;gait speed decreased  -HP     Bilateral Gait Deviations  forward flexed posture  -     Comment, (Gait/Stairs) Pt amb 700' with FWW and CGAx2. Step-through gait pattern with forward flexed posture. No knee buckling or LOB noted. Activity limited by fatigue.  -[facility] Name 11/03/22 1649          Mobility    Extremity Weight-bearing Status left lower extremity  -     Left Lower Extremity (Weight-bearing Status) weight-bearing as tolerated (WBAT)  -           User Key  (r) = Recorded By, (t) = Taken By, (c) = Cosigned By    Initials Name Provider Type     Cathy Mcelroy, SHRADDHA Physical Therapist               Obj/Interventions     [facility] Name 11/03/22 1650          Range of Motion Comprehensive    General Range of Motion no range of motion deficits identified  -[facility] Name 11/03/22 1650          Strength Comprehensive (MMT)    General Manual Muscle Testing (MMT) Assessment lower extremity strength deficits identified  -     Comment, General Manual Muscle Testing (MMT) Assessment Pt able to perform B active ankle DF and LAQ  -[facility] Name 11/03/22 1650          Motor Skills    Therapeutic Exercise knee;ankle;hip  -[facility] Name 11/03/22 1650          Hip (Therapeutic Exercise)    Hip (Therapeutic Exercise) strengthening exercise  -     Hip Strengthening (Therapeutic Exercise) left;aBduction;heel slides;3 repetitions  -[facility] Name 11/03/22 1650          Knee (Therapeutic Exercise)    Knee (Therapeutic Exercise) strengthening exercise;isometric exercises  -     Knee Isometrics (Therapeutic Exercise) left;quad sets;10 repetitions  -     Knee Strengthening (Therapeutic Exercise) left;LAQ (long arc quad);3 repetitions  -[facility] Name 11/03/22 1650          Ankle (Therapeutic Exercise)    Ankle (Therapeutic Exercise) AROM (active range of motion)  -     Ankle AROM (Therapeutic Exercise) bilateral;dorsiflexion;plantarflexion;10 repetitions  -[facility] Name 11/03/22 1650          Balance    Balance Assessment sitting static balance;sitting dynamic  balance;sit to stand dynamic balance;standing static balance;standing dynamic balance  -HP     Static Sitting Balance standby assist  -HP     Dynamic Sitting Balance standby assist  -HP     Position, Sitting Balance sitting edge of bed  -HP     Static Standing Balance contact guard  -HP     Dynamic Standing Balance contact guard  -HP     Position/Device Used, Standing Balance supported;walker, rolling  -HP     Balance Interventions sitting;standing;sit to stand;occupation based/functional task  -     Row Name 11/03/22 1650          Sensory Assessment (Somatosensory)    Sensory Assessment (Somatosensory) LE sensation intact  -           User Key  (r) = Recorded By, (t) = Taken By, (c) = Cosigned By    Initials Name Provider Type     Cathy Mcelroy, PT Physical Therapist               Goals/Plan     Row Name 11/03/22 1653          Bed Mobility Goal 1 (PT)    Activity/Assistive Device (Bed Mobility Goal 1, PT) sit to supine/supine to sit  -HP     [place] Level/Cues Needed (Bed Mobility Goal 1, PT) modified independence  -HP     Time Frame (Bed Mobility Goal 1, PT) long term goal (LTG);3 days  -HP     Progress/Outcomes (Bed Mobility Goal 1, PT) goal ongoing  -     Row Name 11/03/22 1653          Transfer Goal 1 (PT)    Activity/Assistive Device (Transfer Goal 1, PT) sit-to-stand/stand-to-sit  -HP     [place] Level/Cues Needed (Transfer Goal 1, PT) modified independence  -HP     Time Frame (Transfer Goal 1, PT) long term goal (LTG);3 days  -HP     Progress/Outcome (Transfer Goal 1, PT) goal ongoing  -     Row Name 11/03/22 1653          Gait Training Goal 1 (PT)    Activity/Assistive Device (Gait Training Goal 1, PT) gait (walking locomotion)  -HP     [place] Level (Gait Training Goal 1, PT) modified independence  -HP     Distance (Gait Training Goal 1, PT) 500  -HP     Time Frame (Gait Training Goal 1, PT) long term goal (LTG);3 days  -HP     Progress/Outcome (Gait Training Goal 1, PT) goal  ongoing  -     Row Name 11/03/22 1653          Stairs Goal 1 (PT)    Activity/Assistive Device (Stairs Goal 1, PT) ascending stairs;descending stairs  -HP     [place] Level/Cues Needed (Stairs Goal 1, PT) modified independence  -HP     Number of Stairs (Stairs Goal 1, PT) 1  -HP     Time Frame (Stairs Goal 1, PT) long term goal (LTG);3 days  -HP     Progress/Outcome (Stairs Goal 1, PT) goal ongoing  -     Row Name 11/03/22 1653          Therapy Assessment/Plan (PT)    Planned Therapy Interventions (PT) balance training;bed mobility training;gait training;home exercise program;patient/family education;transfer training;stretching;strengthening;stair training  -           User Key  (r) = Recorded By, (t) = Taken By, (c) = Cosigned By    Initials Name Provider Type    HP Cathy Mcelroy, PT Physical Therapist               Clinical Impression     Row Name 11/03/22 1651          Pain    Pretreatment Pain Rating 8/10  -HP     Posttreatment Pain Rating 6/10  -HP     Pain Location - Side/Orientation Left  -     Pain Location generalized  -HP     Pain Location - hip  -HP     Pain Intervention(s) Repositioned;Ambulation/increased activity;Elevated  -     Row Name 11/03/22 1651          Plan of Care Review    Plan of Care Reviewed With patient  -HP     Progress no change  -HP     Outcome Evaluation PT eval complete. Pt performed bed mobility with Min A. Pt performed STS and amb 700' with FWW and CGAx2. No knee buckling or LOB noted. Activity limited by fatigue. HEP and precautions reviewed with pt. Recommend d/c home with assist and OPPT. ADLs assessed. No need for OT consult at this time.  -     Row Name 11/03/22 1651          Therapy Assessment/Plan (PT)    Rehab Potential (PT) good, to achieve stated therapy goals  -     Criteria for Skilled Interventions Met (PT) yes;meets criteria;skilled treatment is necessary  -     Therapy Frequency (PT) 2 times/day  -     Row Name 11/03/22 1651          Vital  Signs    Pre Systolic BP Rehab --  VSS  -HP     Pre Patient Position Supine  -HP     Intra Patient Position Standing  -HP     Post Patient Position Sitting  -HP     Row Name 11/03/22 1651          Positioning and Restraints    Pre-Treatment Position in bed  -HP     Post Treatment Position chair  -HP     In Chair notified nsg;reclined;sitting;call light within reach;encouraged to call for assist;exit alarm on;with family/caregiver;compression device;legs elevated  -HP           User Key  (r) = Recorded By, (t) = Taken By, (c) = Cosigned By    Initials Name Provider Type    Cathy Soriano PT Physical Therapist               Outcome Measures     Row Name 11/03/22 1653          How much help from another person do you currently need...    Turning from your back to your side while in flat bed without using bedrails? 3  -HP     Moving from lying on back to sitting on the side of a flat bed without bedrails? 3  -HP     Moving to and from a bed to a chair (including a wheelchair)? 3  -HP     Standing up from a chair using your arms (e.g., wheelchair, bedside chair)? 3  -HP     Climbing 3-5 steps with a railing? 3  -HP     To walk in hospital room? 3  -HP     AM-PAC 6 Clicks Score (PT) 18  -HP     Highest level of mobility 6 --> Walked 10 steps or more  -     Row Name 11/03/22 1653          PADD    Diagnosis 2  -HP     Gender 2  -HP     Age Group 1  -HP     Gait Distance 2  -HP     Assist Level 1  -HP     Home Support 3  -HP     PADD Score 12  -HP     Patient Preference home with outpatient rehab  -HP     Prediction by PADD Score directly home (with home health or out-patient rehab)  -     Row Name 11/03/22 1653          Functional Assessment    Outcome Measure Options AM-PAC 6 Clicks Basic Mobility (PT);PADD  -HP           User Key  (r) = Recorded By, (t) = Taken By, (c) = Cosigned By    Initials Name Provider Type    Cathy Soriano PT Physical Therapist                             Physical Therapy Education      Title: PT OT SLP Therapies (Done)     Topic: Physical Therapy (Done)     Point: Mobility training (Done)     Learning Progress Summary           Patient Acceptance, E,D, VU,DU by  at 11/3/2022 1654                   Point: Home exercise program (Done)     Learning Progress Summary           Patient Acceptance, E,D, VU,DU by  at 11/3/2022 1654                   Point: Body mechanics (Done)     Learning Progress Summary           Patient Acceptance, E,D, VU,DU by  at 11/3/2022 1654                   Point: Precautions (Done)     Learning Progress Summary           Patient Acceptance, E,D, VU,DU by  at 11/3/2022 1654                               User Key     Initials Effective Dates Name Provider Type Discipline     06/01/21 -  Cathy Mcelroy, PT Physical Therapist PT              PT Recommendation and Plan  Planned Therapy Interventions (PT): balance training, bed mobility training, gait training, home exercise program, patient/family education, transfer training, stretching, strengthening, stair training  Plan of Care Reviewed With: patient  Progress: no change  Outcome Evaluation: PT eval complete. Pt performed bed mobility with Min A. Pt performed STS and amb 700' with FWW and CGAx2. No knee buckling or LOB noted. Activity limited by fatigue. HEP and precautions reviewed with pt. Recommend d/c home with assist and OPPT. ADLs assessed. No need for OT consult at this time.     Time Calculation:    PT Charges     Row Name 11/03/22 1614             Time Calculation    Start Time 1614  -HP      PT Received On 11/03/22  -      PT Goal Re-Cert Due Date 11/13/22  -         Timed Charges    69542 - PT Therapeutic Exercise Minutes 10  -HP         Untimed Charges    PT Eval/Re-eval Minutes 55  -HP         Total Minutes    Timed Charges Total Minutes 10  -HP      Untimed Charges Total Minutes 55  -HP       Total Minutes 65  -HP            User Key  (r) = Recorded By, (t) = Taken By, (c) = Cosigned By    Initials  Name Provider Type     Cathy Mcelroy, PT Physical Therapist              Therapy Charges for Today     Code Description Service Date Service Provider Modifiers Qty    12091832223 HC PT THER PROC EA 15 MIN 11/3/2022 Cathy Mcelroy, PT GP 1    68579869696 HC PT EVAL LOW COMPLEXITY 4 11/3/2022 Cathy Mcelroy, PT GP 1    [id number] HC PT THER SUPP EA 15 MIN 11/3/2022 Cathy Mcelroy, PT GP 4          PT G-Codes  Outcome Measure Options: AM-PAC 6 Clicks Basic Mobility (PT), PADD  AM-PAC 6 Clicks Score (PT): 18    Cathy Mcelroy, PT  11/3/2022

## 2022-11-03 NOTE — ANESTHESIA PROCEDURE NOTES
Airway  Urgency: elective    Date/Time: 11/3/2022 10:09 AM  Airway not difficult    General Information and Staff    Patient location during procedure: OR  SRNA: Stella Myers SRNA  Indications and Patient Condition  Indications for airway management: airway protection    Preoxygenated: yes  MILS not maintained throughout  Mask difficulty assessment: 2 - vent by mask + OA or adjuvant +/- NMBA    Final Airway Details  Final airway type: endotracheal airway      Successful airway: ETT  Cuffed: yes   Successful intubation technique: video laryngoscopy  Blade: Dominguez  Blade size: 3  ETT size (mm): 7.5  Cormack-Lehane Classification: grade I - full view of glottis  Placement verified by: chest auscultation and capnometry   Cuff volume (mL): 8  Measured from: lips  ETT/EBT  to lips (cm): 24  Number of attempts at approach: 2  Assessment: lips, teeth, and gum same as pre-op and atraumatic intubation    Additional Comments  Negative epigastric sounds, Breath sound equal bilaterally with symmetric chest rise and fall    1st attempt with Mac 3 blade, grade 2b view unable to pass ETT. Switched to Dominguez with grade 1 view

## 2022-11-04 VITALS
DIASTOLIC BLOOD PRESSURE: 64 MMHG | HEART RATE: 75 BPM | OXYGEN SATURATION: 97 % | RESPIRATION RATE: 16 BRPM | SYSTOLIC BLOOD PRESSURE: 119 MMHG | TEMPERATURE: 98 F

## 2022-11-04 PROBLEM — E66.9 OBESITY (BMI 30-39.9): Status: ACTIVE | Noted: 2022-11-04

## 2022-11-04 LAB
HCT VFR BLD AUTO: 33.5 % (ref 37.5–51)
HGB BLD-MCNC: 10.7 G/DL (ref 13–17.7)

## 2022-11-04 PROCEDURE — G0008 ADMIN INFLUENZA VIRUS VAC: HCPCS | Performed by: INTERNAL MEDICINE

## 2022-11-04 PROCEDURE — 85018 HEMOGLOBIN: CPT | Performed by: ORTHOPAEDIC SURGERY

## 2022-11-04 PROCEDURE — 97530 THERAPEUTIC ACTIVITIES: CPT

## 2022-11-04 PROCEDURE — 90686 IIV4 VACC NO PRSV 0.5 ML IM: CPT | Performed by: INTERNAL MEDICINE

## 2022-11-04 PROCEDURE — 25010000002 CEFAZOLIN PER 500 MG: Performed by: ORTHOPAEDIC SURGERY

## 2022-11-04 PROCEDURE — 85014 HEMATOCRIT: CPT | Performed by: ORTHOPAEDIC SURGERY

## 2022-11-04 PROCEDURE — 97110 THERAPEUTIC EXERCISES: CPT

## 2022-11-04 PROCEDURE — 25010000002 INFLUENZA VAC SPLIT QUAD 0.5 ML SUSPENSION PREFILLED SYRINGE: Performed by: INTERNAL MEDICINE

## 2022-11-04 RX ORDER — DOCUSATE SODIUM 100 MG/1
100 CAPSULE, LIQUID FILLED ORAL 2 TIMES DAILY
Start: 2022-11-04

## 2022-11-04 RX ORDER — TRAMADOL HYDROCHLORIDE 50 MG/1
50 TABLET ORAL EVERY 6 HOURS PRN
Start: 2022-11-04

## 2022-11-04 RX ORDER — ACETAMINOPHEN 500 MG
1000 TABLET ORAL EVERY 8 HOURS
Qty: 42 TABLET | Refills: 0
Start: 2022-11-04 | End: 2022-11-11

## 2022-11-04 RX ORDER — ONDANSETRON 4 MG/1
4 TABLET, FILM COATED ORAL EVERY 8 HOURS PRN
Start: 2022-11-04

## 2022-11-04 RX ORDER — OXYCODONE HYDROCHLORIDE 5 MG/1
5 TABLET ORAL EVERY 4 HOURS PRN
Refills: 0
Start: 2022-11-04

## 2022-11-04 RX ADMIN — INFLUENZA VIRUS VACCINE 0.5 ML: 15; 15; 15; 15 SUSPENSION INTRAMUSCULAR at 11:59

## 2022-11-04 RX ADMIN — MELOXICAM 15 MG: 15 TABLET ORAL at 08:40

## 2022-11-04 RX ADMIN — OXYCODONE 10 MG: 5 TABLET ORAL at 08:40

## 2022-11-04 RX ADMIN — OXYCODONE 10 MG: 5 TABLET ORAL at 12:48

## 2022-11-04 RX ADMIN — ACETAMINOPHEN 1000 MG: 500 TABLET ORAL at 06:03

## 2022-11-04 RX ADMIN — CEFAZOLIN 2 G: 10 INJECTION, POWDER, FOR SOLUTION INTRAVENOUS at 02:13

## 2022-11-04 NOTE — PROGRESS NOTES
Orthopedic Progress Note      Patient: Osvaldo Vences  YOB: 1960     Date of Admission: 11/3/2022  7:20 AM Medical Record Number: [id number]     Attending Physician: Colin Cevallos MD    Status Post:  Procedure(s):  ANTERIOR TOTAL HIP ARTHROPLASTY - LEFT Post Operative Day Number: 1    Subjective : No new orthopaedic complaints     Pain Relief: some relief with present medication.     Systemic Complaints: No Complaints  Vitals:    11/03/22 2008 11/03/22 2332 11/04/22 0451 11/04/22 0700   BP: 119/72 106/61 111/68 119/64   BP Location: Right arm Right arm Right arm Right arm   Patient Position: Lying Lying Lying Lying   Pulse: 88 81 62 75   Resp: 16 16 16 16   Temp: 98.1 °F (36.7 °C) 98.1 °F (36.7 °C) 97.7 °F (36.5 °C) 98 °F (36.7 °C)   TempSrc: Oral Oral Oral Oral   SpO2: 94% 94% 95% 97%       Physical Exam: 62 y.o. male    General Appearance:       Alert, cooperative, in no acute distress                  Extremities:    Dressing Clean, Dry and Intact             No clinical sign of DVT        Able to do good movements of digits    Pulses:   Pulses palpable and equal bilaterally           Diagnostic Tests:         Results from last 7 days   Lab Units 11/03/22  0818   POTASSIUM mmol/L 4.0         No results found for: URICACID  No results found for: CRYSTAL  Microbiology Results (last 10 days)     ** No results found for the last 240 hours. **        FL C Arm During Surgery    Result Date: 11/3/2022  FINDINGS/IMPRESSION: 28 seconds of fluoroscopy time provided with 1 imaged toward during left hip arthroplasty  This report was finalized on 11/3/2022 11:52 AM by Giovanny Daugherty.      XR Hip With or Without Pelvis 2 - 3 View Left    Result Date: 11/3/2022  Postoperative changes status post hip arthroplasty. Near-anatomic alignment is noted. There is no evidence for periimplant fracture.  This report was finalized on 11/3/2022 12:26 PM by Bishop King MD.          Current Medications:  Scheduled  Meds:acetaminophen, 1,000 mg, Oral, Q8H  atorvastatin, 80 mg, Oral, Nightly  cetirizine, 10 mg, Oral, Nightly  escitalopram, 20 mg, Oral, Nightly  lisinopril, 10 mg, Oral, Q PM  meloxicam, 15 mg, Oral, Daily  metoprolol succinate XL, 25 mg, Oral, Q PM  montelukast, 10 mg, Oral, Nightly      Continuous Infusions:lactated ringers, 9 mL/hr, Last Rate: 9 mL/hr (11/03/22 0959)  lactated ringers, 100 mL/hr, Last Rate: 100 mL/hr (11/03/22 1500)      PRN Meds:.•  HYDROmorphone **AND** naloxone  •  ketorolac  •  labetalol  •  ondansetron  •  oxyCODONE  •  oxyCODONE  •  sodium chloride  •  traMADol    Assessment: Status post  DC TOTAL HIP ARTHROPLASTY [91390] (ANTERIOR TOTAL HIP ARTHROPLASTY - LEFT)    Patient Active Problem List   Diagnosis   • Arthritis of right hip   • Status post total replacement of right hip   • Hyperlipidemia   • Hypertension   • Coronary artery disease   • Prediabetes   • Leukocytosis, likely reactive   • Acute blood loss anemia, mild, asymptomatic   • Acute postoperative pain   • Arthritis of left hip   • Status post total hip replacement, left       PLAN:   Continues current post-op course  Anticoagulation: Brillinta for DVT  Mobilize with PT as tolerated per protocol  Discharge Plan Home today    Weight Bearing: WBAT  Discharge Plan: OK to plan for discharge in  today to home  from orthopaedic perspective.    Neena Livingston PA-C    Date: 11/4/2022    Time: 08:13 EDT

## 2022-11-04 NOTE — THERAPY TREATMENT NOTE
Patient Name: Osvaldo Vences  : 1960    MRN: [id number]                              Today's Date: 2022       Admit Date: 11/3/2022    Visit Dx: No diagnosis found.  Patient Active Problem List   Diagnosis   • Arthritis of right hip   • Status post total replacement of right hip   • Hyperlipidemia   • Hypertension   • Coronary artery disease   • Prediabetes   • Leukocytosis, likely reactive   • Acute blood loss anemia, mild, asymptomatic   • Acute postoperative pain   • Arthritis of left hip   • Status post total hip replacement, left     Past Medical History:   Diagnosis Date   • Arthritis    • Coronary artery disease    • History of heart attack     hx MI x 3   • History of hip replacement, total, right    • History of Horseshoe kidney     surgically repaired in    • History of kidney stones     Multiple   • Hypertension    • Wears glasses      Past Surgical History:   Procedure Laterality Date   • CARDIAC CATHETERIZATION     • COLONOSCOPY      over ten years ago since last colonoscopy ; info given    • CORONARY ANGIOPLASTY WITH STENT PLACEMENT     • KIDNEY SURGERY      horseshoe kidney correction surgery    • TOTAL HIP ARTHROPLASTY Right 2020    Procedure: TOTAL HIP ARTHROPLASTY ANTERIOR DIRECT RIGHT;  Surgeon: Colin Cevallos MD;  Location: [facility];  Service: Orthopedics      General Information     Row Name 22          Physical Therapy Time and Intention    Document Type therapy note (daily note)  -     Mode of Treatment physical therapy;individual therapy  -     Row Name 22          General Information    Existing Precautions/Restrictions hip, anterior  -     Barriers to Rehab none identified  -     Row Name 22          Cognition    Orientation Status (Cognition) oriented x 4  -     Row Name 22          Safety Issues, Functional Mobility    Safety Issues Affecting Function (Mobility) awareness of need for assistance;insight into  deficits/self-awareness;safety precaution awareness  -     Impairments Affecting Function (Mobility) balance;pain;strength  -           User Key  (r) = Recorded By, (t) = Taken By, (c) = Cosigned By    Initials Name Provider Type     Mark Walker, PT Physical Therapist               Mobility     Row Name 11/04/22 0830          Bed Mobility    Supine-Sit [place] (Bed Mobility) minimum assist (75% patient effort);1 person assist;verbal cues  -     Comment, (Bed Mobility) Pt performed supine to sit from bed with HOB flat and no bed rails to simulate home environment, required [place] for trunk control.  -     Row Name 11/04/22 0830          Sit-Stand Transfer    Sit-Stand [place] (Transfers) supervision  -     Assistive Device (Sit-Stand Transfers) walker, front-wheeled  -     Comment, (Sit-Stand Transfer) No LLE buckling or LOB noted during transfers.  -     Row Name 11/04/22 0830          Gait/Stairs (Locomotion)    [place] Level (Gait) supervision  -     Assistive Device (Gait) walker, front-wheeled  -     Distance in Feet (Gait) 1050  -     Deviations/Abnormal Patterns (Gait) gait speed decreased;stride length decreased  -     [place] Level (Stairs) supervision;verbal cues  -     Assistive Device (Stairs) walker, front-wheeled  -     Number of Steps (Stairs) 1  -     Ascending Technique (Stairs) step-to-step  -     Descending Technique (Stairs) step-to-step  -     Comment, (Gait/Stairs) Pt demonstrated step through gait pattern with mild decrease in gait speed. Pt with no knee buckling or LOB noted throughout. Pt with good functional endurance and safety. Pt ascended/descended 1 step with backwards technique with RW requiring cues for proper technique.  -     Row Name 11/04/22 0830          Mobility    Extremity Weight-bearing Status left lower extremity  -     Left Lower Extremity (Weight-bearing Status) weight-bearing as tolerated (WBAT)  -            User Key  (r) = Recorded By, (t) = Taken By, (c) = Cosigned By    Initials Name Provider Type    Mark Emery, PT Physical Therapist               Obj/Interventions     [facility] Name 11/04/22 0833          Hip (Therapeutic Exercise)    Hip Strengthening (Therapeutic Exercise) left;aBduction;aDduction;heel slides;sitting;10 repetitions  -[facility] Name 11/04/22 0833          Knee (Therapeutic Exercise)    Knee Isometrics (Therapeutic Exercise) left;quad sets;sitting;10 repetitions  -     Knee Strengthening (Therapeutic Exercise) left;LAQ (long arc quad);sitting;10 repetitions  -[facility] Name 11/04/22 0833          Ankle (Therapeutic Exercise)    Ankle AROM (Therapeutic Exercise) bilateral;dorsiflexion;plantarflexion;sitting;10 repetitions  -[facility] Name 11/04/22 0833          Balance    Balance Assessment sitting static balance;sitting dynamic balance;standing static balance;standing dynamic balance  -     Static Sitting Balance independent  -     Dynamic Sitting Balance independent  -     Position, Sitting Balance unsupported;sitting edge of bed;sitting in chair  -     Static Standing Balance supervision  -     Dynamic Standing Balance supervision  -     Position/Device Used, Standing Balance supported;walker, rolling  -     Comment, Balance Pt with good balance noted throughout OOB mobility.  -           User Key  (r) = Recorded By, (t) = Taken By, (c) = Cosigned By    Initials Name Provider Type     Mark Walker, PT Physical Therapist               Goals/Plan    No documentation.                Clinical Impression     [facility] Name 11/04/22 0834          Pain    Pretreatment Pain Rating 4/10  -     Posttreatment Pain Rating 4/10  -     Pain Location - Side/Orientation Left  -     Pain Location generalized  -     Pain Location - hip  -     Pain Intervention(s) Repositioned;Ambulation/increased activity;Elevated  -     Row Name 11/04/22 0834          Plan of Care Review    Plan  of Care Reviewed With patient  -LH     Progress improving  -     Outcome Evaluation Pt demonstrating good improvements in functional mobility, progressing ambulation distance to 1,050ft with RW and supervision, performed bed mobility w/ HOB flat and MinAx1, transfers with RW and supervision, and ascended/descended 1 step with RW and backwards technique with supervision. PT provided pt with illustrated HEP and reviewed precautions. Pt safe to return home with assist and OP PT at [place].  -     Row Name 11/04/22 0834          Vital Signs    Pre Systolic BP Rehab 119  -     Pre Treatment Diastolic BP 64  -     Pretreatment Heart Rate (beats/min) 82  -     Pre SpO2 (%) 96  -     O2 Delivery Pre Treatment room air  -     O2 Delivery Intra Treatment room air  -     Post SpO2 (%) 95  -     O2 Delivery Post Treatment room air  -     Pre Patient Position Supine  -     Intra Patient Position Standing  -     Post Patient Position Sitting  -     Row Name 11/04/22 0834          Positioning and Restraints    Pre-Treatment Position in bed  -     Post Treatment Position chair  -     In Chair reclined;call light within reach;encouraged to call for assist;legs elevated;compression device;L heel elevated  -           User Key  (r) = Recorded By, (t) = Taken By, (c) = Cosigned By    Initials Name Provider Type     Mark Walker, PT Physical Therapist               Outcome Measures     Row Name 11/04/22 0836          How much help from another person do you currently need...    Turning from your back to your side while in flat bed without using bedrails? 4  -LH     Moving from lying on back to sitting on the side of a flat bed without bedrails? 3  -LH     Moving to and from a bed to a chair (including a wheelchair)? 4  -LH     Standing up from a chair using your arms (e.g., wheelchair, bedside chair)? 4  -LH     Climbing 3-5 steps with a railing? 3  -LH     To walk in hospital room? 4  -LH     AM-PAC 6  Clicks Score (PT) 22  -     Highest level of mobility 7 --> Walked 25 feet or more  -     Row Name 11/04/22 0836          Functional Assessment    Outcome Measure Options AM-PAC 6 Clicks Basic Mobility (PT)  -           User Key  (r) = Recorded By, (t) = Taken By, (c) = Cosigned By    Initials Name Provider Type     Mark Walker, PT Physical Therapist                             Physical Therapy Education     Title: PT OT SLP Therapies (Done)     Topic: Physical Therapy (Done)     Point: Mobility training (Done)     Learning Progress Summary           Patient Acceptance, E,D,H, VU,NR by  at 11/4/2022 0837    Comment: Reviewed hip precautions, safety with mobility, HEP, and PT POC/DC recommendations    Acceptance, E,D, VU,DU by  at 11/3/2022 1654                   Point: Home exercise program (Done)     Learning Progress Summary           Patient Acceptance, E,D,H, VU,NR by  at 11/4/2022 0837    Comment: Reviewed hip precautions, safety with mobility, HEP, and PT POC/DC recommendations    Acceptance, E,D, VU,DU by  at 11/3/2022 1654                   Point: Body mechanics (Done)     Learning Progress Summary           Patient Acceptance, E,D,H, VU,NR by  at 11/4/2022 0837    Comment: Reviewed hip precautions, safety with mobility, HEP, and PT POC/DC recommendations    Acceptance, E,D, VU,DU by  at 11/3/2022 1654                   Point: Precautions (Done)     Learning Progress Summary           Patient Acceptance, E,D,H, VU,NR by  at 11/4/2022 0837    Comment: Reviewed hip precautions, safety with mobility, HEP, and PT POC/DC recommendations    Acceptance, E,D, VU,DU by  at 11/3/2022 1654                               User Key     Initials Effective Dates Name Provider Type Discipline     09/21/21 -  Mark Walker, PT Physical Therapist PT     06/01/21 -  Cathy Mcelroy PT Physical Therapist PT              PT Recommendation and Plan     Plan of Care Reviewed With:  patient  Progress: improving  Outcome Evaluation: Pt demonstrating good improvements in functional mobility, progressing ambulation distance to 1,050ft with RW and supervision, performed bed mobility w/ HOB flat and MinAx1, transfers with RW and supervision, and ascended/descended 1 step with RW and backwards technique with supervision. PT provided pt with illustrated HEP and reviewed precautions. Pt safe to return home with assist and OP PT at [place].     Time Calculation:    PT Charges     Row Name 11/04/22 0837             Time Calculation    Start Time 0801  -      PT Received On 11/04/22  -      PT Goal Re-Cert Due Date 11/13/22  -         Timed Charges    20460 - PT Therapeutic Exercise Minutes 11  -      47371 - PT Therapeutic Activity Minutes 15  -         Total Minutes    Timed Charges Total Minutes 26 -       Total Minutes 26 -            User Key  (r) = Recorded By, (t) = Taken By, (c) = Cosigned By    Initials Name Provider Type     Mark Walker, PT Physical Therapist              Therapy Charges for Today     Code Description Service Date Service Provider Modifiers Qty    [id number]  PT THER PROC EA 15 MIN 11/4/2022 Mark Walker, PT GP 1    [id number]  PT THERAPEUTIC ACT EA 15 MIN 11/4/2022 Mark Walker, PT GP 1          PT G-Codes  Outcome Measure Options: AM-PAC 6 Clicks Basic Mobility (PT)  AM-PAC 6 Clicks Score (PT): 22    Mark Walker PT  11/4/2022

## 2022-11-04 NOTE — PLAN OF CARE
Goal Outcome Evaluation:   Patient has been resting comfortably with no acute signs of distress. VSS. AO x4. Minor complaints of pain overnight related to surgery but well controlled with prn pain medication. Ambulating well with A x1. Will continue to monitor as patient progresses towards discharge.

## 2022-11-04 NOTE — PLAN OF CARE
Goal Outcome Evaluation:  Plan of Care Reviewed With: patient        Progress: improving  Outcome Evaluation: Pt demonstrating good improvements in functional mobility, progressing ambulation distance to 1,050ft with RW and supervision, performed bed mobility w/ HOB flat and MinAx1, transfers with RW and supervision, and ascended/descended 1 step with RW and backwards technique with supervision. PT provided pt with illustrated HEP and reviewed precautions. Pt safe to return home with assist and OP PT at [place].

## 2022-11-04 NOTE — CASE MANAGEMENT/SOCIAL WORK
Continued Stay Note  [facility]     Patient Name: Osvaldo Vences  MRN: [id number]  Today's Date: 11/4/2022    Admit Date: 11/3/2022    Plan: Home   Discharge Plan     Row Name 11/04/22 1114       Plan    Plan Home    Plan Comments I spoke with patient in regards to discharge planning. He resides with wife, adult son and his family on a multi-level home with patient primarily staying on one level. He has a rolling walker and an commode riser, grab bars. He denies any neef for further DME.  He is agreeable for one visit at [facility] outpatient PT and also tells me he plans to do his own therapy. I will let Bernice Carrero know. Orders are in for outpatient PT. His insurance is [employer] and voiced no concerns of coverage or benefits. His PCP is Sandro Cagle. He has no advanced directives.    13:20: Patient shares with staff he has no rolling walker at home. [facility] will deliver to his room shortly.     Final Discharge Disposition Code 01 - home or self-care               Discharge Codes    No documentation.               Expected Discharge Date and Time     Expected Discharge Date Expected Discharge Time    Nov 4, 2022             Suze Cruz RN

## 2022-11-04 NOTE — DISCHARGE SUMMARY
Patient Name: Osvaldo Vences  MRN: [id number]  : 1960  DOS: 2022    Attending: Colin Cevallos MD    Primary Care Provider: Marty Mason MD    Date of Admission:.11/3/2022  7:20 AM    Date of Discharge:  2022    Discharge Diagnosis:   Status post total hip replacement, left    Hyperlipidemia    Hypertension    Coronary artery disease    Arthritis of left hip      Hospital Course  At admit:  Patient is a pleasant 62 y.o. male presented for scheduled surgery by Dr. Cevallos.     Per his note (62 y.o. male who was admitted to [facility] on a progressive management of nonoperative treatment of their hip osteoarthritis. Unfortunately patient progressed with significant pain and difficulty with ambulation and daily function.  The patient was indicated for a total hip arthroplasty. Likely risk benefits of the procedure including but not limited to infection, DVT, pulmonary embolism, leg length discrepancy, recurrent dislocation, possibility of injury to nerves and vessels, and periprosthetic fractures have been discussed with the patient and her daughter in detail. Despite the risks involved the patient elected to proceed with an informed consent was obtained.).     Patient is known to me from prior hospitalization in 2020 when he had right total hip arthroplasty, he did well postop, was discharged home postop day 1 and did well with his rehab afterwards.     Today he underwent left total hip arthroplasty under spinal anesthesia, tolerated surgery well, is being admitted for further management.     Seen in PACU, doing well, no complains of nausea, vomiting, or shortness of breath.  Good pain control.     Patient with no history of DVT or PE.  He has history of coronary artery disease, he is on regimen of beta-blocker, Brilinta, and statin.     After admit  Patient was provided pain medications as needed for pain control.  Adjustments were made to pain medications to  optimize postop pain management when indicated. Risks and benefits of opiate medications discussed with patient. PALMA report was reviewed.    He was seen by PT and has progressed well over his stay.    He used an IS for atelectasis prophylaxis and Brilinta along with mechanicals for DVT prophylaxis.    Home medications were resumed as appropriate, and labs were monitored and remained fairly stable.     With the progress he has made, he is ready for DC home today.      Discussed with patient regarding plan and he shows understanding and agreement.    Patient will have HHPT following discharge.        Procedures Performed  Procedure(s):  ANTERIOR TOTAL HIP ARTHROPLASTY - LEFT       Pertinent Test Results:    I reviewed the patient's new clinical results.         Invalid input(s): NEUTOPHILPCT,  EOSPCT  Results from last 7 days   Lab Units 22  0818   POTASSIUM mmol/L 4.0     I reviewed the patient's new imaging including images and reports.          Physical therapy  Progress: improving  Outcome Evaluation: Pt demonstrating good improvements in functional mobility, progressing ambulation distance to 1,050ft with RW and supervision, performed bed mobility w/ HOB flat and MinAx1, transfers with RW and supervision, and ascended/descended 1 step with RW and backwards technique with supervision. PT provided pt with illustrated HEP and reviewed precautions. Pt safe to return home with assist and OP PT at DC.    Discharge Assessment:      Visit Vitals  /64 (BP Location: Right arm, Patient Position: Lying)   Pulse 75   Temp 98 °F (36.7 °C) (Oral)   Resp 16   SpO2 97%     Temp (24hrs), Av.2 °F (36.8 °C), Min:97.7 °F (36.5 °C), Max:99 °F (37.2 °C)      General Appearance:    Alert, cooperative, in no acute distress   Lungs:     Clear to auscultation,respirations regular, even and                   unlabored    Heart:    Regular rhythm and normal rate, normal S1 and S2   Abdomen:     Normal bowel sounds, no  masses, no organomegaly, soft        non-tender, non-distended, no guarding, no rebound                 tenderness   Extremities:   CDI dressing , optifoam, over left hip incision.   Pulses:   Pulses palpable and equal bilaterally   Skin:   No bleeding, bruising or rash   Neurologic:   Cranial nerves 2 - 12 grossly intact, sensation intact, Flexion and dorsiflexion intact bilateral feet.         Discharge Disposition:home         Discharge Medications      New Medications      Instructions Start Date   acetaminophen 500 MG tablet  Commonly known as: TYLENOL   1,000 mg, Oral, Every 8 Hours, Take every 8 hours  as needed after 1 week      docusate sodium 100 MG capsule  Commonly known as: Colace   100 mg, Oral, 2 Times Daily      ondansetron 4 MG tablet  Commonly known as: Zofran   4 mg, Oral, Every 8 Hours PRN      oxyCODONE 5 MG immediate release tablet  Commonly known as: Roxicodone   5 mg, Oral, Every 4 Hours PRN      traMADol 50 MG tablet  Commonly known as: ULTRAM   50 mg, Oral, Every 6 Hours PRN         Changes to Medications      Instructions Start Date   atorvastatin 80 MG tablet  Commonly known as: LIPITOR  What changed: Another medication with the same name was removed. Continue taking this medication, and follow the directions you see here.   80 mg, Oral, Nightly         Continue These Medications      Instructions Start Date   Brilinta 90 MG tablet tablet  Generic drug: ticagrelor   90 mg, Oral, 2 Times Daily, Patient states he is to stop Brilinta 1 week prior to surgery      cetirizine 10 MG tablet  Commonly known as: zyrTEC   10 mg, Oral, Daily      escitalopram 20 MG tablet  Commonly known as: LEXAPRO   20 mg, Oral, Nightly      fluticasone 50 MCG/ACT nasal spray  Commonly known as: FLONASE   2 sprays, Nasal      lisinopril 10 MG tablet  Commonly known as: PRINIVIL,ZESTRIL   10 mg, Oral, Every Evening      meloxicam 15 MG tablet  Commonly known as: MOBIC   15 mg, Oral, Daily      metoprolol succinate  XL 25 MG 24 hr tablet  Commonly known as: TOPROL-XL   25 mg, Oral, Every Evening      montelukast 10 MG tablet  Commonly known as: SINGULAIR   10 mg, Oral, Nightly      potassium chloride 10 MEQ CR tablet   10 mEq, Oral      valsartan-hydrochlorothiazide 160-12.5 MG per tablet  Commonly known as: DIOVAN-HCT   1 tablet, Oral, Nightly         Stop These Medications    HYDROcodone-acetaminophen 7.5-325 MG per tablet  Commonly known as: Norco            Discharge Diet: resume home diet    Activity at Discharge: activity per Dr. Cevallos     Follow-up Appointments  Follow up with Dr. Cevallos per his recommendation    Dragon disclaimer:  Part of this encounter note is an electronic transcription/translation of spoken language to printed text. The electronic translation of spoken language may permit erroneous, or at times, nonsensical words or phrases to be inadvertently transcribed; Although I have reviewed the note for such errors, some may still exist.       Ruthy Souza MD  11/04/22  09:46 EDT

## 2022-11-04 NOTE — PLAN OF CARE
Problem: Adult Inpatient Plan of Care  Goal: Plan of Care Review  Outcome: Ongoing, Progressing  Flowsheets (Taken 11/4/2022 1525)  Progress: improving  Plan of Care Reviewed With: patient  Goal: Patient-Specific Goal (Individualized)  Outcome: Ongoing, Progressing  Goal: Absence of Hospital-Acquired Illness or Injury  Outcome: Ongoing, Progressing  Intervention: Identify and Manage Fall Risk  Recent Flowsheet Documentation  Taken 11/4/2022 1243 by Genet Luong RN  Safety Promotion/Fall Prevention:   activity supervised   assistive device/personal items within reach   safety round/check completed  Taken 11/4/2022 1025 by Genet Luong RN  Safety Promotion/Fall Prevention:   activity supervised   assistive device/personal items within reach   safety round/check completed  Taken 11/4/2022 0836 by Genet Luong RN  Safety Promotion/Fall Prevention:   activity supervised   assistive device/personal items within reach   clutter free environment maintained   fall prevention program maintained   lighting adjusted   muscle strengthening facilitated   nonskid shoes/slippers when out of bed   room organization consistent   safety round/check completed  Intervention: Prevent Skin Injury  Recent Flowsheet Documentation  Taken 11/4/2022 1243 by Genet Luong RN  Body Position: legs elevated  Taken 11/4/2022 1025 by Genet Luong RN  Body Position: legs elevated  Taken 11/4/2022 0836 by Genet Luong RN  Body Position:   supine   position changed independently  Skin Protection:   adhesive use limited   incontinence pads utilized  Intervention: Prevent and Manage VTE (Venous Thromboembolism) Risk  Recent Flowsheet Documentation  Taken 11/4/2022 1328 by Genet Luong RN  Activity Management: ambulated in room  Taken 11/4/2022 1243 by Genet Luong RN  Activity Management: up in chair  VTE Prevention/Management:   bilateral   sequential compression devices off  Range of Motion: active ROM (range of motion)  encouraged  Taken 11/4/2022 1025 by Genet Luong RN  Activity Management: up in chair  VTE Prevention/Management:   bilateral   sequential compression devices off  Taken 11/4/2022 0836 by Genet Luong RN  Activity Management: activity adjusted per tolerance  VTE Prevention/Management:   bilateral   sequential compression devices on  Range of Motion: active ROM (range of motion) encouraged  Intervention: Prevent Infection  Recent Flowsheet Documentation  Taken 11/4/2022 1243 by Genet Luong RN  Infection Prevention:   hand hygiene promoted   rest/sleep promoted  Taken 11/4/2022 1025 by Genet Luong RN  Infection Prevention:   hand hygiene promoted   rest/sleep promoted  Taken 11/4/2022 0836 by Genet Luong RN  Infection Prevention:   hand hygiene promoted   rest/sleep promoted  Goal: Optimal Comfort and Wellbeing  Outcome: Ongoing, Progressing  Intervention: Monitor Pain and Promote Comfort  Recent Flowsheet Documentation  Taken 11/4/2022 1243 by Genet Luong RN  Pain Management Interventions: see MAR  Taken 11/4/2022 0836 by Genet Luong RN  Pain Management Interventions:   see MAR   pillow support provided  Intervention: Provide Person-Centered Care  Recent Flowsheet Documentation  Taken 11/4/2022 1328 by Genet Luong RN  Trust Relationship/Rapport: care explained  Taken 11/4/2022 1243 by Genet Luong RN  Trust Relationship/Rapport: care explained  Taken 11/4/2022 1025 by Genet Luong RN  Trust Relationship/Rapport: care explained  Taken 11/4/2022 0836 by Genet Luong RN  Trust Relationship/Rapport:   care explained   choices provided   questions answered   questions encouraged   reassurance provided  Goal: Readiness for Transition of Care  Outcome: Ongoing, Progressing     Problem: Skin Injury Risk Increased  Goal: Skin Health and Integrity  Outcome: Ongoing, Progressing  Intervention: Optimize Skin Protection  Recent Flowsheet Documentation  Taken 11/4/2022 1243 by  Genet Luong RN  Head of Bed ([facility]) Positioning: HOB elevated  Taken 11/4/2022 1025 by Genet Luong RN  Head of Bed ([facility] [place]) Positioning: HOB elevated  Taken 11/4/2022 0836 by Genet Luong RN  Pressure Reduction Techniques: frequent weight shift encouraged  Head of Bed ([facility] [place]) Positioning: HOB elevated  Pressure Reduction Devices: pressure-redistributing mattress utilized  Skin Protection:   adhesive use limited   incontinence pads utilized     Problem: Fall Injury Risk  Goal: Absence of Fall and Fall-Related Injury  Outcome: Ongoing, Progressing  Intervention: Identify and Manage Contributors  Recent Flowsheet Documentation  Taken 11/4/2022 1243 by Genet Luong RN  Self-Care Promotion: independence encouraged  Taken 11/4/2022 1025 by Genet Luong RN  Self-Care Promotion: independence encouraged  Taken 11/4/2022 0836 by Genet Luong RN  Medication Review/Management: medications reviewed  Self-Care Promotion: independence encouraged  Intervention: Promote Injury-Free Environment  Recent Flowsheet Documentation  Taken 11/4/2022 1243 by Genet Luong RN  Safety Promotion/Fall Prevention:   activity supervised   assistive device/personal items within reach   safety round/check completed  Taken 11/4/2022 1025 by Genet Luong RN  Safety Promotion/Fall Prevention:   activity supervised   assistive device/personal items within reach   safety round/check completed  Taken 11/4/2022 0836 by Genet Luong RN  Safety Promotion/Fall Prevention:   activity supervised   assistive device/personal items within reach   clutter free environment maintained   fall prevention program maintained   lighting adjusted   muscle strengthening facilitated   nonskid shoes/slippers when out of bed   room organization consistent   safety round/check completed     Problem: Adjustment to Surgery (Hip Arthroplasty)  Goal: Optimal Coping  Outcome: Ongoing, Progressing     Problem: Bleeding (Hip Arthroplasty)  Goal: Absence  of Bleeding  Outcome: Ongoing, Progressing  Intervention: Monitor and Manage Bleeding  Recent Flowsheet Documentation  Taken 11/4/2022 0836 by Genet Luong RN  Bleeding Management: dressing monitored     Problem: Bowel Motility Impaired (Hip Arthroplasty)  Goal: Effective Bowel Elimination  Outcome: Ongoing, Progressing     Problem: Fluid and Electrolyte Imbalance (Hip Arthroplasty)  Goal: Fluid and Electrolyte Balance  Outcome: Ongoing, Progressing     Problem: Functional Ability Impaired (Hip Arthroplasty)  Goal: Optimal Functional Ability  Outcome: Ongoing, Progressing  Intervention: Promote Optimal Functional Status  Recent Flowsheet Documentation  Taken 11/4/2022 1328 by Genet Luong RN  Activity Management: ambulated in room  Taken 11/4/2022 1243 by Genet Luong RN  Activity Management: up in chair  Self-Care Promotion: independence encouraged  Taken 11/4/2022 1025 by Genet Luong RN  Activity Management: up in chair  Self-Care Promotion: independence encouraged  Taken 11/4/2022 0836 by Genet Luong RN  Activity Management: activity adjusted per tolerance  Self-Care Promotion: independence encouraged     Problem: Infection (Hip Arthroplasty)  Goal: Absence of Infection Signs and Symptoms  Outcome: Ongoing, Progressing  Intervention: Prevent or Manage Infection  Recent Flowsheet Documentation  Taken 11/4/2022 0836 by Genet Luong RN  Infection Management: aseptic technique maintained     Problem: Neurovascular Compromise (Hip Arthroplasty)  Goal: Intact Neurovascular Status  Outcome: Ongoing, Progressing     Problem: Ongoing Anesthesia Effects (Hip Arthroplasty)  Goal: Anesthesia/Sedation Recovery  Outcome: Ongoing, Progressing  Intervention: Optimize Anesthesia Recovery  Recent Flowsheet Documentation  Taken 11/4/2022 1243 by Genet Luong RN  Patient Tolerance (IS): good  Safety Promotion/Fall Prevention:   activity supervised   assistive device/personal items within reach   safety  round/check completed  Administration (IS): self-administered  Taken 11/4/2022 1025 by Genet Luong RN  Safety Promotion/Fall Prevention:   activity supervised   assistive device/personal items within reach   safety round/check completed  Administration (IS): self-administered  Taken 11/4/2022 0836 by Genet Luong RN  Stabilization Measures: airway opened  Patient Tolerance (IS): good  Safety Promotion/Fall Prevention:   activity supervised   assistive device/personal items within reach   clutter free environment maintained   fall prevention program maintained   lighting adjusted   muscle strengthening facilitated   nonskid shoes/slippers when out of bed   room organization consistent   safety round/check completed  Administration (IS): self-administered     Problem: Pain (Hip Arthroplasty)  Goal: Acceptable Pain Control  Outcome: Ongoing, Progressing  Intervention: Prevent or Manage Pain  Recent Flowsheet Documentation  Taken 11/4/2022 1243 by Genet Luong RN  Pain Management Interventions: see MAR  Taken 11/4/2022 0836 by Genet Luong RN  Pain Management Interventions:   see MAR   pillow support provided  Diversional Activities:   television   smartphone     Problem: Postoperative Nausea and Vomiting (Hip Arthroplasty)  Goal: Nausea and Vomiting Relief  Outcome: Ongoing, Progressing     Problem: Postoperative Urinary Retention (Hip Arthroplasty)  Goal: Effective Urinary Elimination  Outcome: Ongoing, Progressing     Problem: Respiratory Compromise (Hip Arthroplasty)  Goal: Effective Oxygenation and Ventilation  Outcome: Ongoing, Progressing  Intervention: Optimize Oxygenation and Ventilation  Recent Flowsheet Documentation  Taken 11/4/2022 1243 by Genet Luong RN  Head of Bed (HOB) Positioning: HOB elevated  Taken 11/4/2022 1025 by Genet Luong RN  Head of Bed (HOB) Positioning: HOB elevated  Taken 11/4/2022 0836 by Genet Luong RN  Head of Bed (HOB) Positioning: HOB elevated   Goal  Outcome Evaluation:  Plan of Care Reviewed With: patient      Pt discharged home with assistance of wife. Left hip CDI with pt denying any numbness or tingling. Pt ambulatory with walker. Walker given to pt. Vitals WNL. Neuro checks WNL. Pt sent with belongings. Prescriptions already with pt wife. No other observations at this time.  Progress: improving

## 2025-02-03 ENCOUNTER — TRANSCRIBE ORDERS (OUTPATIENT)
Facility: HOSPITAL | Age: 65
End: 2025-02-03
Payer: MEDICARE

## 2025-02-03 DIAGNOSIS — E78.00 PURE HYPERCHOLESTEROLEMIA: Primary | ICD-10-CM

## 2025-02-26 PROBLEM — I07.1 TRICUSPID VALVE INSUFFICIENCY: Status: ACTIVE | Noted: 2025-02-26

## 2025-02-26 NOTE — PROGRESS NOTES
Cardiology Patient Note     Name: Osvaldo Vences  :   1960  PCP: Marty Mason MD  Date:   2025  Department: [facility] CARDIOLOGY  [address]  [facility] [address]  Fax [phone]  Phone [phone]    CC-Here for my heart.  Subjective     History of Present Illness  Osvaldo Vences is a 64 y.o. male who presents today for 4-week follow-up.  Patient was seen in clinic 2025, Labs were requested from PCP, Xarelto was stopped secondary to cost.  Patient has history of NSTEMI, CAD, ischemic cardiomyopathy recovered EF, hyperlipidemia, hypertension  Currently having no chest pain chest discomfort or shortness of breath.  He is able to walk 2-3 blocks without difficulty.  Denies palpitations presyncope or syncope  Cath 2022: EF 60%, RCA stenting  Cath 6/15/2014: NSTEMI, EF 40%, stent LAD  Echo 2025: EF 56%, mild TR, mild LVH  Labs 25: Triglycerides 332, LDL 60    No Known Allergies    Current Outpatient Medications:     aspirin 81 MG EC tablet, Take 1 tablet by mouth Daily., Disp: , Rfl:     atorvastatin (LIPITOR) 80 MG tablet, Take 1 tablet by mouth Every Night., Disp: , Rfl:     docusate sodium (Colace) 100 MG capsule, Take 1 capsule by mouth 2 (Two) Times a Day., Disp: , Rfl:     escitalopram (LEXAPRO) 20 MG tablet, Take 1 tablet by mouth Every Night., Disp: , Rfl:     lisinopril (PRINIVIL,ZESTRIL) 10 MG tablet, Take 1 tablet by mouth Every Evening., Disp: , Rfl:     meloxicam (MOBIC) 15 MG tablet, Take 1 tablet by mouth Daily., Disp: , Rfl:     metoprolol succinate XL (TOPROL-XL) 25 MG 24 hr tablet, Take 1 tablet by mouth Every Evening., Disp: , Rfl:     ondansetron (Zofran) 4 MG tablet, Take 1 tablet by mouth Every 8 (Eight) Hours As Needed for Nausea or Vomiting., Disp: , Rfl:     oxyCODONE (Roxicodone) 5 MG immediate release tablet, Take 1 tablet by mouth Every 4 (Four) Hours As Needed for Moderate Pain., Disp:  ", Rfl: 0    traMADol (ULTRAM) 50 MG tablet, Take 1 tablet by mouth Every 6 (Six) Hours As Needed for Moderate Pain., Disp: , Rfl:     valsartan-hydrochlorothiazide (DIOVAN-HCT) 160-12.5 MG per tablet, Take 1 tablet by mouth Every Night., Disp: , Rfl:     Brilinta 90 MG tablet tablet, Take 1 tablet by mouth 2 (Two) Times a Day. Patient states he is to stop Brilinta 1 week prior to surgery (Patient not taking: Reported on 2/27/2025), Disp: , Rfl:     cetirizine (zyrTEC) 10 MG tablet, Take 1 tablet by mouth Daily., Disp: , Rfl:     fluticasone (FLONASE) 50 MCG/ACT nasal spray, 2 sprays into the nostril(s) as directed by provider., Disp: , Rfl:     montelukast (SINGULAIR) 10 MG tablet, Take 1 tablet by mouth Every Night., Disp: , Rfl:     Objective     Vital Signs:  BP 99/74 (BP Location: Left arm, Patient Position: Sitting, Cuff Size: Adult)   Pulse 56   Ht 172.7 cm (68\")   Wt 107 kg (235 lb)   BMI 35.73 kg/m²   Estimated body mass index is 35.73 kg/m² as calculated from the following:    Height as of this encounter: 172.7 cm (68\").    Weight as of this encounter: 107 kg (235 lb).             Physical Exam  Vitals and nursing note reviewed. Exam conducted with a chaperone present.   HENT:      Mouth/Throat:      Mouth: Mucous membranes are moist.      Pharynx: Oropharynx is clear.   Neck:      Vascular: No carotid bruit.   Cardiovascular:      Rate and Rhythm: Normal rate and regular rhythm.      Pulses: Normal pulses.      Heart sounds: Murmur heard.      No friction rub. No gallop.      Comments: No JVD  2/6 NICOLA  Pulmonary:      Effort: Pulmonary effort is normal.      Breath sounds: Normal breath sounds.   Skin:     General: Skin is warm and dry.      Capillary Refill: Capillary refill takes more than 3 seconds.   Neurological:      General: No focal deficit present.      Mental Status: He is alert.   Psychiatric:         Mood and Affect: Mood normal.             Lab Results   Component Value Date    GLUCOSE 168 " "(H) 10/21/2022    BUN 16 10/21/2022    CREATININE 0.97 10/21/2022    EGFRIFNONA 90 01/10/2020    BCR 16.5 10/21/2022    K 4.0 11/03/2022    CO2 26.0 10/21/2022    CALCIUM 9.3 10/21/2022    ALBUMIN 4.60 10/21/2022    AST 17 10/21/2022    ALT 26 10/21/2022     No results found for: \"CHOL\", \"CHLPL\", \"TRIG\", \"HDL\", \"LDL\", \"LDLDIRECT\"   Lab Results   Component Value Date    WBC 5.41 10/21/2022    RBC 4.61 10/21/2022    HGB 10.7 (L) 11/04/2022    HCT 33.5 (L) 11/04/2022    MCV 88.7 10/21/2022     10/21/2022     No results found for: \"TSH\"  Lab Results   Component Value Date    HGBA1C 6.10 (H) 10/21/2022       Assessment and Plan     Assessment & Plan  Coronary artery disease involving native coronary artery of native heart without angina pectoris           Hyperlipidemia LDL goal <55     Near guideline  Continue lipitor at current dose.  Orders:    Hepatic Function Panel; Future    High Sensitivity CRP; Future    Lipoprotein A (LPA); Future    Lipid Panel; Future    Tricuspid valve insufficiency, unspecified etiology  2 D Echo if symptomatic       Prediabetes    Orders:    Hemoglobin A1c; Future        Follow Up  No follow-ups on file.    "

## 2025-02-27 ENCOUNTER — OFFICE VISIT (OUTPATIENT)
Age: 65
End: 2025-02-27
Payer: MEDICARE

## 2025-02-27 VITALS
DIASTOLIC BLOOD PRESSURE: 74 MMHG | HEART RATE: 56 BPM | BODY MASS INDEX: 35.61 KG/M2 | HEIGHT: 68 IN | SYSTOLIC BLOOD PRESSURE: 99 MMHG | WEIGHT: 235 LBS

## 2025-02-27 DIAGNOSIS — I07.1 TRICUSPID VALVE INSUFFICIENCY, UNSPECIFIED ETIOLOGY: ICD-10-CM

## 2025-02-27 DIAGNOSIS — E78.5 HYPERLIPIDEMIA LDL GOAL <55: ICD-10-CM

## 2025-02-27 DIAGNOSIS — R73.03 PREDIABETES: ICD-10-CM

## 2025-02-27 DIAGNOSIS — I25.10 CORONARY ARTERY DISEASE INVOLVING NATIVE CORONARY ARTERY OF NATIVE HEART WITHOUT ANGINA PECTORIS: Primary | ICD-10-CM

## 2025-02-27 RX ORDER — ASPIRIN 81 MG/1
81 TABLET ORAL DAILY
COMMUNITY

## 2025-02-27 NOTE — ASSESSMENT & PLAN NOTE
Near guideline  Continue lipitor at current dose.  Orders:    Hepatic Function Panel; Future    High Sensitivity CRP; Future    Lipoprotein A (LPA); Future    Lipid Panel; Future

## 2025-07-14 ENCOUNTER — TELEPHONE (OUTPATIENT)
Age: 65
End: 2025-07-14
Payer: MEDICARE

## 2025-07-14 RX ORDER — ISOSORBIDE MONONITRATE 30 MG/1
30 TABLET, EXTENDED RELEASE ORAL DAILY
Qty: 90 TABLET | Refills: 0 | Status: SHIPPED | OUTPATIENT
Start: 2025-07-14

## 2025-07-14 NOTE — TELEPHONE ENCOUNTER
Incoming Refill Request      Medication requested (name and dose): ISOSORBIDE MONONITRATE 30MG ER TABS    Pharmacy where request should be sent: [facility]    Additional details provided by patient: NONE    Best call back number: [phone]    Does the patient have less than a 3 day supply:  [] Yes  [] No    Lara Sandifer  07/14/25, 10:23 EDT

## 2025-08-29 ENCOUNTER — OFFICE VISIT (OUTPATIENT)
Age: 65
End: 2025-08-29
Payer: COMMERCIAL

## 2025-08-29 VITALS
BODY MASS INDEX: 36.37 KG/M2 | HEIGHT: 68 IN | SYSTOLIC BLOOD PRESSURE: 97 MMHG | DIASTOLIC BLOOD PRESSURE: 62 MMHG | HEART RATE: 70 BPM | WEIGHT: 240 LBS

## 2025-08-29 DIAGNOSIS — E78.5 HYPERLIPIDEMIA LDL GOAL <55: ICD-10-CM

## 2025-08-29 DIAGNOSIS — I25.10 CAD IN NATIVE ARTERY: ICD-10-CM

## 2025-08-29 DIAGNOSIS — I10 PRIMARY HYPERTENSION: ICD-10-CM

## 2025-08-29 RX ORDER — RIVAROXABAN 2.5 MG/1
2.5 TABLET, FILM COATED ORAL 2 TIMES DAILY
Qty: 180 TABLET | Refills: 1 | Status: SHIPPED | OUTPATIENT
Start: 2025-08-29

## 2025-08-29 RX ORDER — POTASSIUM CHLORIDE 750 MG/1
10 TABLET, EXTENDED RELEASE ORAL DAILY
COMMUNITY
Start: 2025-07-14 | End: 2026-07-14

## (undated) DEVICE — NDL HYPO ECLPS SFTY 18G 1 1/2IN

## (undated) DEVICE — SYR LUERLOK 50ML

## (undated) DEVICE — GLV SURG TRIUMPH CLASSIC PF LTX 8.5 STRL

## (undated) DEVICE — ANTIBACTERIAL UNDYED BRAIDED (POLYGLACTIN 910), SYNTHETIC ABSORBABLE SUTURE: Brand: COATED VICRYL

## (undated) DEVICE — GLV SURG DERMASSURE GRN LF PF 7.0

## (undated) DEVICE — SOL ISO/ALC RUB 70PCT 4OZ

## (undated) DEVICE — NDL HYPO ECLPS SFTY 22G 1 1/2IN

## (undated) DEVICE — 3M™ STERI-STRIP™ REINFORCED ADHESIVE SKIN CLOSURES, R1547, 1/2 IN X 4 IN (12 MM X 100 MM), 6 STRIPS/ENVELOPE: Brand: 3M™ STERI-STRIP™

## (undated) DEVICE — GLV SURG PREMIERPRO MIC LTX PF SZ7.5 BRN

## (undated) DEVICE — 6617 IOBAN II PATIENT ISOLATION DRAPE 5/BX,4BX/CS: Brand: STERI-DRAPE™ IOBAN™ 2

## (undated) DEVICE — PK MAJ SHLDR SPLT 10

## (undated) DEVICE — SOL ISO/ALC 70PCT 16OZ

## (undated) DEVICE — PREP SOL POVIDONE/IODINE BT 4OZ

## (undated) DEVICE — UNDERGLV SURG BIOGEL INDICAT PI SZ8 BLU

## (undated) DEVICE — TBG PENCL TELESCP MEGADYNE SMOKE EVAC 10FT

## (undated) DEVICE — GLV SURG SENSICARE MICRO PF LF 7 STRL

## (undated) DEVICE — SHEET,DRAPE,53X77,STERILE: Brand: MEDLINE

## (undated) DEVICE — SYR CONTRL LUERLOK 10CC

## (undated) DEVICE — UNDERGLV SURG BIOGEL INDICAT PI SZ8.5 BLU

## (undated) DEVICE — MARKER,SKIN,W/RULER,DUAL,STOP: Brand: MEDLINE

## (undated) DEVICE — GLV SURG PREMIERPRO MIC LTX PF SZ6.5 BRN

## (undated) DEVICE — GLV SURG SENSICARE PI MIC PF SZ9 LF STRL

## (undated) DEVICE — PULLOVER TOGA, 2X LARGE: Brand: FLYTE, SURGICOOL

## (undated) DEVICE — ADHS SKIN PREMIERPRO EXOFIN TOPICAL HI/VISC .5ML

## (undated) DEVICE — GLV SURG SENSICARE PI LF PF 6.5

## (undated) DEVICE — GLV SURG SENSICARE MICRO PF LF 9 STRL

## (undated) DEVICE — BNDG ELAS CO-FLEX SLF ADHR 4IN5YD LF STRL

## (undated) DEVICE — DRSNG WND STRIP OPTIFOAM AG SUPRABS A/MIC 4X8IN STRL

## (undated) DEVICE — 1010 S-DRAPE TOWEL DRAPE 10/BX: Brand: STERI-DRAPE™

## (undated) DEVICE — HANDPIECE SET WITH HIGH FLOW TIP AND SUCTION TUBE: Brand: INTERPULSE

## (undated) DEVICE — 3M™ IOBAN™ 2 ANTIMICROBIAL INCISE DRAPE 6651EZ: Brand: IOBAN™ 2

## (undated) DEVICE — SPK10295 ORTHOPEDIC FRACTURE KIT: Brand: SPK10295 ORTHOPEDIC FRACTURE KIT

## (undated) DEVICE — SYR CONTRL PRESS/LO FIX/M/LL W/THMB/RNG 10ML

## (undated) DEVICE — GLV SURG SENSICARE PI LF PF 7.0

## (undated) DEVICE — STRIP,CLOSURE,WOUND,MEDI-STRIP,1/2X4: Brand: MEDLINE

## (undated) DEVICE — GLV SURG SENSICARE MICRO PF LF 6.5 STRL

## (undated) DEVICE — SUT MNCRYL PLS ANTIB UD 3/0 PS2 27IN

## (undated) DEVICE — C-ARM DRAPE: Brand: DEROYAL

## (undated) DEVICE — TRY SKINPREP DRYPREP

## (undated) DEVICE — SYR LL 3CC

## (undated) DEVICE — BLANKT WARM UPPR/BDY ARM/OUT 57X196CM

## (undated) DEVICE — GLV SURG PREMIERPRO ORTHO LTX PF SZ8 BRN

## (undated) DEVICE — COVER,LIGHT HANDLE,FLX,1/PK: Brand: MEDLINE INDUSTRIES, INC.

## (undated) DEVICE — PULLOVER TOGA, X-LARGE: Brand: FLYTE, SURGICOOL

## (undated) DEVICE — STERILE PVP: Brand: MEDLINE INDUSTRIES, INC.

## (undated) DEVICE — DISPOSABLE ORTHOPAEDIC PROTECTOR: Brand: ALEXIS ® ORTHOPAEDIC PROTECTOR

## (undated) DEVICE — SCRB SURG BACTOSHIELD CHG 4PCT 4OZ

## (undated) DEVICE — STRYKER PERFORMANCE SERIES SAGITTAL BLADE: Brand: STRYKER PERFORMANCE SERIES

## (undated) DEVICE — GLV SURG PREMIERPRO MIC LTX PF SZ7 BRN

## (undated) DEVICE — GLV SURG SENSICARE PI ORTHO SZ8.5 LF STRL

## (undated) DEVICE — BNDG COBAN S/ADHR WRP 4IN 5YD TN

## (undated) DEVICE — PREMIUM DRY TRAY LF: Brand: MEDLINE INDUSTRIES, INC.

## (undated) DEVICE — PATIENT RETURN ELECTRODE, SINGLE-USE, CONTACT QUALITY MONITORING, ADULT, WITH 9FT CORD, FOR PATIENTS WEIGING OVER 33LBS. (15KG): Brand: MEGADYNE

## (undated) DEVICE — SUT ETHLN 3/0 FS1 30IN 669H

## (undated) DEVICE — SYR LL W/SCALE/MARK 3ML STRL

## (undated) DEVICE — SKIN AFFIX SURG ADHESIVE 72/CS 0.55ML: Brand: MEDLINE

## (undated) DEVICE — GLV SURG SENSICARE MICRO PF LF 8.5 STRL